# Patient Record
Sex: FEMALE | Race: WHITE | NOT HISPANIC OR LATINO | Employment: UNEMPLOYED | URBAN - METROPOLITAN AREA
[De-identification: names, ages, dates, MRNs, and addresses within clinical notes are randomized per-mention and may not be internally consistent; named-entity substitution may affect disease eponyms.]

---

## 2023-03-01 LAB
ALANINE AMINOTRANSFERASE (SGPT) (U/L) IN SER/PLAS: 13 U/L (ref 7–45)
ALBUMIN (G/DL) IN SER/PLAS: 4.4 G/DL (ref 3.4–5)
ALBUMIN (MG/L) IN URINE: <7 MG/L
ALBUMIN/CREATININE (UG/MG) IN URINE: ABNORMAL UG/MG CRT (ref 0–30)
ALKALINE PHOSPHATASE (U/L) IN SER/PLAS: 66 U/L (ref 33–110)
ANION GAP IN SER/PLAS: 12 MMOL/L (ref 10–20)
ASPARTATE AMINOTRANSFERASE (SGOT) (U/L) IN SER/PLAS: 16 U/L (ref 9–39)
BASOPHILS (10*3/UL) IN BLOOD BY AUTOMATED COUNT: 0.08 X10E9/L (ref 0–0.1)
BASOPHILS/100 LEUKOCYTES IN BLOOD BY AUTOMATED COUNT: 1.2 % (ref 0–2)
BILIRUBIN TOTAL (MG/DL) IN SER/PLAS: 0.4 MG/DL (ref 0–1.2)
CALCIUM (MG/DL) IN SER/PLAS: 9.5 MG/DL (ref 8.6–10.3)
CARBON DIOXIDE, TOTAL (MMOL/L) IN SER/PLAS: 29 MMOL/L (ref 21–32)
CHLORIDE (MMOL/L) IN SER/PLAS: 104 MMOL/L (ref 98–107)
CHOLESTEROL (MG/DL) IN SER/PLAS: 138 MG/DL (ref 0–199)
CHOLESTEROL IN HDL (MG/DL) IN SER/PLAS: 58.7 MG/DL
CHOLESTEROL/HDL RATIO: 2.4
CREATININE (MG/DL) IN SER/PLAS: 0.65 MG/DL (ref 0.5–1.05)
CREATININE (MG/DL) IN URINE: 11.4 MG/DL (ref 20–320)
EOSINOPHILS (10*3/UL) IN BLOOD BY AUTOMATED COUNT: 0.59 X10E9/L (ref 0–0.7)
EOSINOPHILS/100 LEUKOCYTES IN BLOOD BY AUTOMATED COUNT: 8.5 % (ref 0–6)
ERYTHROCYTE DISTRIBUTION WIDTH (RATIO) BY AUTOMATED COUNT: 11.9 % (ref 11.5–14.5)
ERYTHROCYTE MEAN CORPUSCULAR HEMOGLOBIN CONCENTRATION (G/DL) BY AUTOMATED: 33.7 G/DL (ref 32–36)
ERYTHROCYTE MEAN CORPUSCULAR VOLUME (FL) BY AUTOMATED COUNT: 95 FL (ref 80–100)
ERYTHROCYTES (10*6/UL) IN BLOOD BY AUTOMATED COUNT: 4.39 X10E12/L (ref 4–5.2)
GFR FEMALE: >90 ML/MIN/1.73M2
GLUCOSE (MG/DL) IN SER/PLAS: 142 MG/DL (ref 74–99)
HEMATOCRIT (%) IN BLOOD BY AUTOMATED COUNT: 41.6 % (ref 36–46)
HEMOGLOBIN (G/DL) IN BLOOD: 14 G/DL (ref 12–16)
IMMATURE GRANULOCYTES (10*3/UL) ABSOLUTE: 0.01 X10E9/L (ref 0–0.7)
IMMATURE GRANULOCYTES/100 LEUKOCYTES IN BLOOD BY AUTOMATED COUNT: 0.1 % (ref 0–0.9)
LDL: 62 MG/DL (ref 0–99)
LEUKOCYTES (10*3/UL) IN BLOOD BY AUTOMATED COUNT: 6.9 X10E9/L (ref 4.4–11.3)
LYMPHOCYTES (10*3/UL) IN BLOOD BY AUTOMATED COUNT: 2.99 X10E9/L (ref 1.2–4.8)
LYMPHOCYTES/100 LEUKOCYTES IN BLOOD BY AUTOMATED COUNT: 41.9 % (ref 13–44)
MONOCYTES (10*3/UL) IN BLOOD BY AUTOMATED COUNT: 0.46 X10E9/L (ref 0.1–1)
MONOCYTES/100 LEUKOCYTES IN BLOOD BY AUTOMATED COUNT: 6.6 % (ref 2–10)
NEUTROPHILS (10*3/UL) IN BLOOD BY AUTOMATED COUNT: 2.88 X10E9/L (ref 1.2–7.7)
NEUTROPHILS/100 LEUKOCYTES IN BLOOD BY AUTOMATED COUNT: 41.7 % (ref 40–80)
NRBC (PER 100 WBCS) BY AUTOMATED COUNT: 0 /100 WBC (ref 0–0)
PHOSPHATE (MG/DL) IN SER/PLAS: 4.7 MG/DL (ref 2.5–4.9)
PLATELETS (10*3/UL) IN BLOOD AUTOMATED COUNT: 320 X10E9/L (ref 150–450)
POTASSIUM (MMOL/L) IN SER/PLAS: 4.2 MMOL/L (ref 3.5–5.3)
PROTEIN TOTAL: 6.6 G/DL (ref 6.4–8.2)
SODIUM (MMOL/L) IN SER/PLAS: 141 MMOL/L (ref 136–145)
THYROTROPIN (MIU/L) IN SER/PLAS BY DETECTION LIMIT <= 0.05 MIU/L: 4.5 MIU/L (ref 0.44–3.98)
THYROXINE (T4) FREE (NG/DL) IN SER/PLAS: 1.17 NG/DL (ref 0.61–1.12)
TRIGLYCERIDE (MG/DL) IN SER/PLAS: 86 MG/DL (ref 0–149)
UREA NITROGEN (MG/DL) IN SER/PLAS: 15 MG/DL (ref 6–23)
VLDL: 17 MG/DL (ref 0–40)

## 2023-03-02 LAB
CALCIDIOL (25 OH VITAMIN D3) (NG/ML) IN SER/PLAS: 78 NG/ML
COBALAMIN (VITAMIN B12) (PG/ML) IN SER/PLAS: >2000 PG/ML (ref 211–911)

## 2023-05-17 LAB — COBALAMIN (VITAMIN B12) (PG/ML) IN SER/PLAS: 366 PG/ML (ref 211–911)

## 2023-08-24 PROBLEM — Z22.322 MRSA (METHICILLIN RESISTANT STAPH AUREUS) CULTURE POSITIVE: Status: ACTIVE | Noted: 2023-08-24

## 2023-08-24 PROBLEM — E78.5 HYPERLIPIDEMIA: Status: ACTIVE | Noted: 2023-08-24

## 2023-08-24 PROBLEM — E03.9 HYPOTHYROIDISM: Status: ACTIVE | Noted: 2023-08-24

## 2023-08-24 PROBLEM — N91.2 AMENORRHEA: Status: ACTIVE | Noted: 2023-08-24

## 2023-08-24 PROBLEM — F89 DEVELOPMENTAL DISABILITY: Status: ACTIVE | Noted: 2023-08-24

## 2023-08-24 PROBLEM — E10.65 TYPE 1 DIABETES MELLITUS WITH HYPERGLYCEMIA (MULTI): Status: ACTIVE | Noted: 2023-08-24

## 2023-08-24 PROBLEM — L08.9 LOCAL INFECTION OF SKIN AND SUBCUTANEOUS TISSUE: Status: ACTIVE | Noted: 2023-08-24

## 2023-08-24 PROBLEM — L03.90 CELLULITIS: Status: ACTIVE | Noted: 2023-08-24

## 2023-08-24 PROBLEM — E10.9 TYPE 1 DIABETES MELLITUS WITHOUT COMPLICATION (MULTI): Status: ACTIVE | Noted: 2023-08-24

## 2023-08-24 PROBLEM — N92.6 IRREGULAR MENSES: Status: ACTIVE | Noted: 2023-08-24

## 2023-08-24 RX ORDER — PEN NEEDLE, DIABETIC 32GX 5/32"
NEEDLE, DISPOSABLE MISCELLANEOUS
COMMUNITY
Start: 2023-01-08

## 2023-08-24 RX ORDER — INSULIN LISPRO 100 [IU]/ML
50 INJECTION, SOLUTION INTRAVENOUS; SUBCUTANEOUS
COMMUNITY
End: 2024-01-19 | Stop reason: WASHOUT

## 2023-08-24 RX ORDER — CYANOCOBALAMIN (VITAMIN B-12) 500 MCG
1 TABLET ORAL NIGHTLY
COMMUNITY
End: 2024-04-16 | Stop reason: WASHOUT

## 2023-08-24 RX ORDER — LEVOTHYROXINE SODIUM 125 UG/1
125 TABLET ORAL
COMMUNITY
End: 2023-11-15 | Stop reason: WASHOUT

## 2023-08-24 RX ORDER — GUANFACINE 2 MG/1
2 TABLET, EXTENDED RELEASE ORAL DAILY
COMMUNITY

## 2023-08-24 RX ORDER — ERGOCALCIFEROL 1.25 MG/1
50000 CAPSULE ORAL
COMMUNITY
Start: 2023-02-24

## 2023-08-24 RX ORDER — INSULIN GLARGINE 100 [IU]/ML
12 INJECTION, SOLUTION SUBCUTANEOUS NIGHTLY
COMMUNITY
End: 2024-01-19 | Stop reason: WASHOUT

## 2023-10-19 NOTE — PROGRESS NOTES
"HPI:  43 yo with Diabetes 1, hashimoto's dz, dyslipidemia, developmental delay presents for followup. A1c-8.5% in the past month. Pt is testing sugars 4 times per day, using dexcom. Pt is having low sugars 1-2 times/week, recently after dinner. Sugars in upper 100-200's on waking, variable at lunch (at workshop), dinner 100-200's, bedtime variable.  Pt is following a carb controlled diet and knows reasonable carb allowances. The challenge is pt may be getting extra food/snack at workshop and not receiving insulin. This has made a challenging situation more challenging. Pt is able to afford their medications. Pt is walking as exercise.           Using 8 units lantus qhs, Humalog 7-7-6-0, isf-20 at 140. Pt wouldn't be able to control pump on her own/give prandial dose at workshop after taking with her caregiver.    Discussed having pump locked with a tslim and she already has dexcom g6.  Could get a straight partial prandial dose at workshop for her lunches.                  Pt taking estradiol through her ob for premature menopause.           Taking 100mg syntroid, no obstructive sx, euthyroid by hx.           Taking atorvastatin 20mg for lipids and tolerating.           Full labs reviewed from march 2023 in ecw.    History is obtained from Ani valle's caregiver today.     Current Outpatient Medications:     BD Claudia 2nd Gen Pen Needle 32 gauge x 5/32\" needle, , Disp: , Rfl:     blood sugar diagnostic strip, as directed sq three times per day, e10.65 for 90 days, Disp: , Rfl:     ergocalciferol (Vitamin D-2) 1.25 MG (56922 UT) capsule, Take 1 capsule (50,000 Units) by mouth 1 (one) time per week., Disp: , Rfl:     guanFACINE (Intuniv) 2 mg 24 hr tablet, Take 1 tablet (2 mg) by mouth once daily., Disp: , Rfl:     insulin lispro (HumaLOG KwikPen Insulin) 100 unit/mL injection, Inject 50 Units under the skin. Daily with meals, Disp: , Rfl:     Lantus Solostar U-100 Insulin 100 unit/mL (3 mL) pen, Inject 12 Units under " the skin once daily at bedtime., Disp: , Rfl:     levothyroxine (Synthroid, Levoxyl) 125 mcg tablet, Take 1 tablet (125 mcg) by mouth once daily in the morning. Take before meals., Disp: , Rfl:     melatonin tablet, Take 1 tablet (1 mg) by mouth once daily at bedtime., Disp: , Rfl:     NON FORMULARY, Estradiol, Disp: , Rfl:    Review of Systems:  Cardiology: Lightheadedness-denies.  Chest pain-denies.  Leg edema-denies.  Palpitations-denies.  Respiratory: Cough-denies. Shortness of breath-denies.  Wheezing-denies.  Gastroenterology: Constipation-denies.  Diarrhea-denies.  Heartburn-denies.  Endocrinology: Cold intolerance-denies.  Heat intolerance-denies.  Sweats-denies.  Neurology: Headache-denies.  Tremor-denies.  Neuropathy in extremities-denies.  Psychology: Low energy-denies.  Irritability-denies.  Sleep disturbances-denies.        Labs: see hpi    /76   Pulse 58   Wt 72.1 kg (159 lb)   BMI 32.11 kg/m²     Physical Exam:  General Appearance: pleasant, cooperative, no acute distress  HEENT: no chemosis, no proptosis, no lid lag, no lid retraction  Neck: no lymphadenopathy, no thyromegaly, no dominant thyroid nodules  Heart: no murmurs, regular rate and rhythm, S1 and S2  Lungs: no wheezes, no rhonci, no rales  Extremities: no lower extremity swelling    Assessment and Plan:  1. Type 1 diabetes mellitus without complication (CMS/HCC)  -very challenging case, issue is what pt eats at lunch at workshop is not controlled  -look into [facility] where the pump would be locked and her caregiver could bolus for her, at workshop pt could get a partial injected prandial dose  -despite hours/years of visits and teaching we are still not close to glycemic targets    2. Mixed hyperlipidemia  -on statin and tolerating    3. Hypothyroidism, unspecified type  -euthyroid    4. Developmental disability           Follow Up:  3 months Trey Garcia    -labs/tests/notes reviewed  -reviewed and counseled patient on medication  monitoring and side effects

## 2023-10-20 ENCOUNTER — OFFICE VISIT (OUTPATIENT)
Dept: ENDOCRINOLOGY | Facility: CLINIC | Age: 43
End: 2023-10-20
Payer: MEDICARE

## 2023-10-20 VITALS
HEART RATE: 58 BPM | WEIGHT: 159 LBS | DIASTOLIC BLOOD PRESSURE: 76 MMHG | SYSTOLIC BLOOD PRESSURE: 128 MMHG | BODY MASS INDEX: 32.11 KG/M2

## 2023-10-20 DIAGNOSIS — E03.9 HYPOTHYROIDISM, UNSPECIFIED TYPE: ICD-10-CM

## 2023-10-20 DIAGNOSIS — E10.9 TYPE 1 DIABETES MELLITUS WITHOUT COMPLICATION (MULTI): Primary | ICD-10-CM

## 2023-10-20 DIAGNOSIS — F89 DEVELOPMENTAL DISABILITY: ICD-10-CM

## 2023-10-20 DIAGNOSIS — E10.65 TYPE 1 DIABETES MELLITUS WITH HYPERGLYCEMIA (MULTI): ICD-10-CM

## 2023-10-20 DIAGNOSIS — E78.2 MIXED HYPERLIPIDEMIA: ICD-10-CM

## 2023-10-20 LAB — POC HEMOGLOBIN A1C: 10.1 % (ref 4.2–6.5)

## 2023-10-20 PROCEDURE — 3078F DIAST BP <80 MM HG: CPT | Performed by: INTERNAL MEDICINE

## 2023-10-20 PROCEDURE — 99214 OFFICE O/P EST MOD 30 MIN: CPT | Performed by: INTERNAL MEDICINE

## 2023-10-20 PROCEDURE — 3074F SYST BP LT 130 MM HG: CPT | Performed by: INTERNAL MEDICINE

## 2023-10-20 PROCEDURE — 83036 HEMOGLOBIN GLYCOSYLATED A1C: CPT | Performed by: INTERNAL MEDICINE

## 2023-10-20 PROCEDURE — 1036F TOBACCO NON-USER: CPT | Performed by: INTERNAL MEDICINE

## 2023-10-20 ASSESSMENT — ENCOUNTER SYMPTOMS: DEPRESSION: 0

## 2023-10-20 ASSESSMENT — PAIN SCALES - GENERAL: PAINLEVEL: 0-NO PAIN

## 2023-10-30 ENCOUNTER — TELEPHONE (OUTPATIENT)
Dept: ENDOCRINOLOGY | Facility: CLINIC | Age: 43
End: 2023-10-30
Payer: MEDICAID

## 2023-10-30 NOTE — TELEPHONE ENCOUNTER
patient needs to get fasting blood work done but her sugar was 318 this morning she wants to know what to do

## 2023-11-06 ENCOUNTER — LAB (OUTPATIENT)
Dept: LAB | Facility: LAB | Age: 43
End: 2023-11-06
Payer: MEDICARE

## 2023-11-06 DIAGNOSIS — E03.9 HYPOTHYROIDISM, UNSPECIFIED TYPE: ICD-10-CM

## 2023-11-06 DIAGNOSIS — E10.9 TYPE 1 DIABETES MELLITUS WITHOUT COMPLICATION (MULTI): ICD-10-CM

## 2023-11-06 DIAGNOSIS — E78.2 MIXED HYPERLIPIDEMIA: ICD-10-CM

## 2023-11-06 DIAGNOSIS — E03.9 HYPOTHYROIDISM, UNSPECIFIED TYPE: Primary | ICD-10-CM

## 2023-11-06 LAB
ALBUMIN SERPL BCP-MCNC: 3.7 G/DL (ref 3.4–5)
ALP SERPL-CCNC: 53 U/L (ref 33–110)
ALT SERPL W P-5'-P-CCNC: 16 U/L (ref 7–45)
ANION GAP SERPL CALC-SCNC: 15 MMOL/L (ref 10–20)
AST SERPL W P-5'-P-CCNC: 22 U/L (ref 9–39)
BASOPHILS # BLD AUTO: 0.09 X10*3/UL (ref 0–0.1)
BASOPHILS NFR BLD AUTO: 1.2 %
BILIRUB SERPL-MCNC: 0.6 MG/DL (ref 0–1.2)
BUN SERPL-MCNC: 13 MG/DL (ref 6–23)
C PEPTIDE SERPL-MCNC: <0.1 NG/ML (ref 0.7–3.9)
CALCIUM SERPL-MCNC: 8.6 MG/DL (ref 8.6–10.3)
CHLORIDE SERPL-SCNC: 101 MMOL/L (ref 98–107)
CHOLEST SERPL-MCNC: 125 MG/DL (ref 0–199)
CHOLESTEROL/HDL RATIO: 2.2
CO2 SERPL-SCNC: 24 MMOL/L (ref 21–32)
CREAT SERPL-MCNC: 0.66 MG/DL (ref 0.5–1.05)
CREAT UR-MCNC: 140 MG/DL (ref 20–320)
EOSINOPHIL # BLD AUTO: 0.81 X10*3/UL (ref 0–0.7)
EOSINOPHIL NFR BLD AUTO: 10.7 %
ERYTHROCYTE [DISTWIDTH] IN BLOOD BY AUTOMATED COUNT: 12.4 % (ref 11.5–14.5)
GFR SERPL CREATININE-BSD FRML MDRD: >90 ML/MIN/1.73M*2
GLUCOSE SERPL-MCNC: 268 MG/DL (ref 74–99)
HCT VFR BLD AUTO: 44.4 % (ref 36–46)
HDLC SERPL-MCNC: 55.9 MG/DL
HGB BLD-MCNC: 14.3 G/DL (ref 12–16)
IMM GRANULOCYTES # BLD AUTO: 0.02 X10*3/UL (ref 0–0.7)
IMM GRANULOCYTES NFR BLD AUTO: 0.3 % (ref 0–0.9)
LYMPHOCYTES # BLD AUTO: 2.57 X10*3/UL (ref 1.2–4.8)
LYMPHOCYTES NFR BLD AUTO: 34 %
MCH RBC QN AUTO: 32 PG (ref 26–34)
MCHC RBC AUTO-ENTMCNC: 32.2 G/DL (ref 32–36)
MCV RBC AUTO: 99 FL (ref 80–100)
MICROALBUMIN UR-MCNC: <7 MG/L
MICROALBUMIN/CREAT UR: NORMAL MG/G{CREAT}
MONOCYTES # BLD AUTO: 0.53 X10*3/UL (ref 0.1–1)
MONOCYTES NFR BLD AUTO: 7 %
NEUTROPHILS # BLD AUTO: 3.54 X10*3/UL (ref 1.2–7.7)
NEUTROPHILS NFR BLD AUTO: 46.8 %
NON-HDL CHOLESTEROL: 69 MG/DL (ref 0–149)
NRBC BLD-RTO: 0 /100 WBCS (ref 0–0)
PLATELET # BLD AUTO: 331 X10*3/UL (ref 150–450)
POTASSIUM SERPL-SCNC: 4 MMOL/L (ref 3.5–5.3)
PROT SERPL-MCNC: 6.4 G/DL (ref 6.4–8.2)
RBC # BLD AUTO: 4.47 X10*6/UL (ref 4–5.2)
SODIUM SERPL-SCNC: 136 MMOL/L (ref 136–145)
T4 FREE SERPL-MCNC: 0.83 NG/DL (ref 0.61–1.12)
TSH SERPL-ACNC: 11.03 MIU/L (ref 0.44–3.98)
WBC # BLD AUTO: 7.6 X10*3/UL (ref 4.4–11.3)

## 2023-11-06 PROCEDURE — 83718 ASSAY OF LIPOPROTEIN: CPT

## 2023-11-06 PROCEDURE — 80053 COMPREHEN METABOLIC PANEL: CPT

## 2023-11-06 PROCEDURE — 84681 ASSAY OF C-PEPTIDE: CPT

## 2023-11-06 PROCEDURE — 82570 ASSAY OF URINE CREATININE: CPT

## 2023-11-06 PROCEDURE — 84439 ASSAY OF FREE THYROXINE: CPT

## 2023-11-06 PROCEDURE — 82043 UR ALBUMIN QUANTITATIVE: CPT

## 2023-11-06 PROCEDURE — 36415 COLL VENOUS BLD VENIPUNCTURE: CPT

## 2023-11-06 PROCEDURE — 85025 COMPLETE CBC W/AUTO DIFF WBC: CPT

## 2023-11-06 PROCEDURE — 82465 ASSAY BLD/SERUM CHOLESTEROL: CPT

## 2023-11-06 PROCEDURE — 84443 ASSAY THYROID STIM HORMONE: CPT

## 2023-11-06 RX ORDER — LEVOTHYROXINE SODIUM 125 UG/1
125 TABLET ORAL
Qty: 90 TABLET | Refills: 3 | Status: SHIPPED | OUTPATIENT
Start: 2023-11-06 | End: 2023-11-15 | Stop reason: WASHOUT

## 2023-11-09 DIAGNOSIS — E10.65 TYPE 1 DIABETES MELLITUS WITH HYPERGLYCEMIA (MULTI): Primary | ICD-10-CM

## 2023-11-13 ENCOUNTER — LAB (OUTPATIENT)
Dept: LAB | Facility: LAB | Age: 43
End: 2023-11-13
Payer: MEDICARE

## 2023-11-13 DIAGNOSIS — E10.65 TYPE 1 DIABETES MELLITUS WITH HYPERGLYCEMIA (MULTI): ICD-10-CM

## 2023-11-13 LAB
ANION GAP SERPL CALC-SCNC: 11 MMOL/L (ref 10–20)
BUN SERPL-MCNC: 14 MG/DL (ref 6–23)
CALCIUM SERPL-MCNC: 9.1 MG/DL (ref 8.6–10.3)
CHLORIDE SERPL-SCNC: 100 MMOL/L (ref 98–107)
CO2 SERPL-SCNC: 29 MMOL/L (ref 21–32)
CREAT SERPL-MCNC: 0.73 MG/DL (ref 0.5–1.05)
GFR SERPL CREATININE-BSD FRML MDRD: >90 ML/MIN/1.73M*2
GLUCOSE SERPL-MCNC: 211 MG/DL (ref 74–99)
POTASSIUM SERPL-SCNC: 3.7 MMOL/L (ref 3.5–5.3)
SODIUM SERPL-SCNC: 136 MMOL/L (ref 136–145)

## 2023-11-13 PROCEDURE — 36415 COLL VENOUS BLD VENIPUNCTURE: CPT

## 2023-11-13 PROCEDURE — 80048 BASIC METABOLIC PNL TOTAL CA: CPT

## 2023-11-13 PROCEDURE — 84681 ASSAY OF C-PEPTIDE: CPT

## 2023-11-14 LAB — C PEPTIDE SERPL-MCNC: 0.1 NG/ML (ref 0.7–3.9)

## 2023-11-28 DIAGNOSIS — E10.65 TYPE 1 DIABETES MELLITUS WITH HYPERGLYCEMIA (MULTI): Primary | ICD-10-CM

## 2023-11-28 RX ORDER — BLOOD-GLUCOSE,RECEIVER,CONT
EACH MISCELLANEOUS
Qty: 1 EACH | Refills: 1 | Status: SHIPPED | OUTPATIENT
Start: 2023-11-28 | End: 2024-01-19 | Stop reason: WASHOUT

## 2023-11-28 RX ORDER — BLOOD-GLUCOSE TRANSMITTER
EACH MISCELLANEOUS
Qty: 1 EACH | Refills: 3 | Status: SHIPPED | OUTPATIENT
Start: 2023-11-28

## 2023-12-12 DIAGNOSIS — E10.65 TYPE 1 DIABETES MELLITUS WITH HYPERGLYCEMIA (MULTI): Primary | ICD-10-CM

## 2023-12-12 RX ORDER — INSULIN LISPRO 100 [IU]/ML
INJECTION, SOLUTION INTRAVENOUS; SUBCUTANEOUS
Qty: 90 ML | Refills: 3 | Status: SHIPPED | OUTPATIENT
Start: 2023-12-12

## 2023-12-22 ENCOUNTER — TELEPHONE (OUTPATIENT)
Dept: ENDOCRINOLOGY | Facility: CLINIC | Age: 43
End: 2023-12-22
Payer: MEDICAID

## 2023-12-22 NOTE — TELEPHONE ENCOUNTER
nurse calling because staff is concerned about the beeping from the pump she said it beeps quite oftenalso ther is a big difference from a fingerstick today 260 and pump today 333 a few days ago her fingerstick was 34 and the pump was 60

## 2024-01-19 ENCOUNTER — CLINICAL SUPPORT (OUTPATIENT)
Dept: ENDOCRINOLOGY | Facility: CLINIC | Age: 44
End: 2024-01-19
Payer: MEDICARE

## 2024-01-19 VITALS — SYSTOLIC BLOOD PRESSURE: 128 MMHG | WEIGHT: 157 LBS | BODY MASS INDEX: 31.71 KG/M2 | DIASTOLIC BLOOD PRESSURE: 76 MMHG

## 2024-01-19 DIAGNOSIS — E78.2 MIXED HYPERLIPIDEMIA: ICD-10-CM

## 2024-01-19 DIAGNOSIS — E03.9 HYPOTHYROIDISM, UNSPECIFIED TYPE: ICD-10-CM

## 2024-01-19 DIAGNOSIS — E10.65 TYPE 1 DIABETES MELLITUS WITH HYPERGLYCEMIA (MULTI): Primary | ICD-10-CM

## 2024-01-19 LAB — POC HEMOGLOBIN A1C: 8.4 % (ref 4.2–6.5)

## 2024-01-19 PROCEDURE — 95251 CONT GLUC MNTR ANALYSIS I&R: CPT | Performed by: INTERNAL MEDICINE

## 2024-01-19 PROCEDURE — 99214 OFFICE O/P EST MOD 30 MIN: CPT | Performed by: INTERNAL MEDICINE

## 2024-01-19 PROCEDURE — 83036 HEMOGLOBIN GLYCOSYLATED A1C: CPT | Performed by: INTERNAL MEDICINE

## 2024-01-19 RX ORDER — LEVOTHYROXINE SODIUM 125 UG/1
125 TABLET ORAL
Qty: 30 TABLET | Refills: 11 | Status: SHIPPED | OUTPATIENT
Start: 2024-01-19 | End: 2025-01-18

## 2024-01-19 RX ORDER — BLOOD-GLUCOSE SENSOR
EACH MISCELLANEOUS
COMMUNITY
Start: 2023-11-28 | End: 2024-06-02

## 2024-01-19 RX ORDER — ESTRADIOL AND NORETHINDRONE ACETATE .5; .1 MG/1; MG/1
TABLET ORAL
COMMUNITY
End: 2024-04-16 | Stop reason: WASHOUT

## 2024-01-19 RX ORDER — ATORVASTATIN CALCIUM 20 MG/1
20 TABLET, FILM COATED ORAL DAILY
COMMUNITY
End: 2024-03-21

## 2024-01-19 NOTE — PATIENT INSTRUCTIONS
INCREASE Levothyroxine from 100mcg to now 125mcg daily  - repeat blood work before next visit     Changes made to pump settings:  - new time segment 10a-3p correction factor from 50 to now 40    Lab Results   Component Value Date    HGBA1C 8.4 (A) 01/19/2024     KEEP UP THE GREAT WORK!

## 2024-01-19 NOTE — PROGRESS NOTES
"HPI     44 yo with Diabetes 1, hashimoto's dz, dyslipidemia, developmental delay presents for followup. A1c-10.1% last visit,  8.4% today. Pt is testing sugars >4 times per day, using dexcom. Pt is having low sugars 0 times/week since starting pump.   Pt is following a carb controlled diet and knows reasonable carb allowances. The challenge is pt may be getting extra food/snack at workshop and not receiving insulin. Pt is able to afford their medications. Pt is walking as exercise.    PUMP:  tandem tslim with dexcom cgm using control IQ  - pump training with nativid[employer] Dec. 2023 / approx. 1mon ago  Basal:   12a 0.75  ICR:   12a 1:15  ISF:   12a 50  - pt locked out of pump, caregiver bolusing for pt.  Relies on control iq autobolusing while at workshop during the day for her lunches  - caregiver got new smartphone since training/starting on pump,  now compatible with [employer] shantell.    Assisted in downloading shantell to iPhone during visit today, unfortunately Ani unable to remember login info has it written down at home.  Given tandem handouts for pairing pump to phone / troubleshooting shantell issues etc.,  instructed to contact [employer] for assistance if runs into any issues trying to do this on her own.                   Pt taking estradiol through her ob for premature menopause.           Taking 100mg syntroid, no obstructive sx, euthyroid by hx.           Taking atorvastatin 20mg for lipids and tolerating.          Full labs reviewed from march 2023 in ecw.    History is obtained from Ani pt's caregiver today.    30 days tconnect data reviewed & attached: 63% in target (best yet by far!),  0% lows   - mostly smooth mid 100 range at all times EXCEPT midday when at workshop excursions into mid 200 range        Current Outpatient Medications:     BD Claudia 2nd Gen Pen Needle 32 gauge x 5/32\" needle, , Disp: , Rfl:     blood sugar diagnostic strip, as directed sq three times per day, e10.65 for 90 days, Disp: , Rfl:     Dexcom " G4 platinum transmitter (Dexcom G6 Transmitter) device, USE AS DIRECTED FOR 90 DAYS, Disp: 1 each, Rfl: 3    Dexcom G6 Sensor device, USE AS DIRECTED EVERY 10 DAYS FOR 30 DAYS, Disp: , Rfl:     ergocalciferol (Vitamin D-2) 1.25 MG (20639 UT) capsule, Take 1 capsule (50,000 Units) by mouth 1 (one) time per week., Disp: , Rfl:     guanFACINE (Intuniv) 2 mg 24 hr tablet, Take 1 tablet (2 mg) by mouth once daily., Disp: , Rfl:     insulin lispro (HumaLOG) 100 unit/mL injection, -up to 100 units daily as directed in insulin pump, Disp: 90 mL, Rfl: 3    levothyroxine (Synthroid, Levoxyl) 125 mcg tablet, Take 1 tablet (125 mcg) by mouth once daily in the morning. Take before meals., Disp: 30 tablet, Rfl: 11    melatonin tablet, Take 1 tablet (1 mg) by mouth once daily at bedtime., Disp: , Rfl:     atorvastatin (Lipitor) 20 mg tablet, Take 1 tablet (20 mg) by mouth once daily., Disp: , Rfl:     estradiol-norethindrone acet 0.5-0.1 mg tablet, TAKE 1 TABLET BY MOUTH EVERY DAY FOR 28 DAYS ORALLY ONCE A DAY 90 DAYS, Disp: , Rfl:     Allergies as of 01/19/2024    (No Known Allergies)       /76   Wt 71.2 kg (157 lb)   BMI 31.71 kg/m²     Labs:   Lab Results   Component Value Date    WBC 7.6 11/06/2023    NRBC 0.0 11/06/2023    RBC 4.47 11/06/2023    HGB 14.3 11/06/2023    HCT 44.4 11/06/2023     11/06/2023     Lab Results   Component Value Date    CALCIUM 9.1 11/13/2023    AST 22 11/06/2023    ALKPHOS 53 11/06/2023    BILITOT 0.6 11/06/2023    PROT 6.4 11/06/2023    ALBUMIN 3.7 11/06/2023    GLOB 2.9 12/21/2021    AGR 1.4 (L) 12/21/2021     11/13/2023    K 3.7 11/13/2023     11/13/2023    CO2 29 11/13/2023    ANIONGAP 11 11/13/2023    BUN 14 11/13/2023    CREATININE 0.73 11/13/2023    UREACREAUR 21.4 (H) 02/09/2023    GLUCOSE 211 (H) 11/13/2023    ALT 16 11/06/2023    EGFR >90 11/13/2023     Lab Results   Component Value Date    CHOL 125 11/06/2023    TRIG 86 03/01/2023    HDL 55.9 11/06/2023    LDLCALC  64 (L) 09/15/2020     Lab Results   Component Value Date    MICROALBCREA  11/06/2023      Comment:      One or more analytes used in this calculation is outside of the analytical measurement range. Calculation cannot be performed.     Lab Results   Component Value Date    TSH 11.03 (H) 11/06/2023     Lab Results   Component Value Date    [id number] 366 05/17/2023     Lab Results   Component Value Date    HGBA1C 8.4 (A) 01/19/2024         Assessment/Plan   1. Type 1 diabetes mellitus with hyperglycemia (CMS/[facility])  Outstanding improvement in glycemic control with only approx. 1mon on hybrid closed loop system   - intensify ISF 10a-3p while at workshop from 50 to now 40   - finish setting up [employer] shantell once locate login info at home,  contact [employer] to help facilitate this if needed     2. Mixed hyperlipidemia  On statin,  tolerating     3. Hypothyroidism, unspecified type  Reviewed labs - currently taking 100mcg daily   - suggest incr to 125mcg daily,  repeat labs prior to next visit       Follow up: 3 months Radha, 6 months 30min BB    -labs/tests/notes reviewed  -reviewed and counseled patient on medication monitoring and side effects    Treatment and plan discussed with Dr. Koch.  LIAM Piper, PharmD, BC-ADM, [facility].     Medical Decision Making  Complexity of problem: Chronic illness of diabetes mellitus uncontrolled, progressing  Data analyzed and reviewed: Reviewed prior notes, blood glucose data, labs including HgbA1c, lipids, serum chemistries.  Ordered tests.   Risk of complications and morbidities: Is definite because of use of insulin and risk of hypoglycemia.  Prescription medications reviewed and modifications made.  Compliance assessed.  Addressed social determinants of health including food insecurity.

## 2024-01-19 NOTE — PROGRESS NOTES
I, Dr Cristian Koch, have reviewed this progress note, medication list, vital signs, any pertinent lab values, and any CGM data if present with the [facility] and  face to face during this visit today. This note reflects the treatment plan that was made under my direction after reviewing the above mentioned elements while face to face with the patient and CDE.  I personally answered and addressed any questions and concerns the patient had during the visit today.  The CDE entered the data in this note under my direction and I personally reviewed it, signed any lab or medication orders that I instructed to be completed. I am the billing provider for this visit and the level of service was determined by my involvement in the Medical Decision Making Component of this visit while face to face with the patient.    Electronically signed by Cristian Koch MD on 1/19/24 at 3:38 PM.

## 2024-01-31 ENCOUNTER — OFFICE VISIT (OUTPATIENT)
Dept: PRIMARY CARE | Facility: CLINIC | Age: 44
End: 2024-01-31
Payer: MEDICARE

## 2024-01-31 VITALS
SYSTOLIC BLOOD PRESSURE: 104 MMHG | HEIGHT: 59 IN | HEART RATE: 66 BPM | BODY MASS INDEX: 31.45 KG/M2 | DIASTOLIC BLOOD PRESSURE: 71 MMHG | OXYGEN SATURATION: 95 % | WEIGHT: 156 LBS

## 2024-01-31 DIAGNOSIS — Z00.00 WELLNESS EXAMINATION: ICD-10-CM

## 2024-01-31 DIAGNOSIS — E10.65 TYPE 1 DIABETES MELLITUS WITH HYPERGLYCEMIA (MULTI): Primary | ICD-10-CM

## 2024-01-31 DIAGNOSIS — F89 DEVELOPMENTAL DISABILITY: ICD-10-CM

## 2024-01-31 DIAGNOSIS — E03.9 HYPOTHYROIDISM, UNSPECIFIED TYPE: ICD-10-CM

## 2024-01-31 DIAGNOSIS — Z12.31 BREAST CANCER SCREENING BY MAMMOGRAM: ICD-10-CM

## 2024-01-31 PROCEDURE — 3074F SYST BP LT 130 MM HG: CPT | Performed by: NURSE PRACTITIONER

## 2024-01-31 PROCEDURE — 3078F DIAST BP <80 MM HG: CPT | Performed by: NURSE PRACTITIONER

## 2024-01-31 PROCEDURE — 99204 OFFICE O/P NEW MOD 45 MIN: CPT | Performed by: NURSE PRACTITIONER

## 2024-01-31 PROCEDURE — 1036F TOBACCO NON-USER: CPT | Performed by: NURSE PRACTITIONER

## 2024-01-31 NOTE — PROGRESS NOTES
"Subjective   Patient ID: Seda Gutierrez is a 43 y.o. female who presents for New Patient Visit (Patient is here today to establish new pcp care today. ).    43-year-old female very pleasant , new pt here to establish care.   Accompanied by sister who is deaf.  She did well reading my lips and her sister/Seda/patient did sign language.  His 2 sisters live together  medical HX beside DM1- treated by endo  Hypothyroid- managed by endo  Mamm for breast ca screen 2/2023  GYN 2 yrs ago  Vision- yearly- wears glasses; and a specialist for the DM1  Dentist- Yearly  Podiatry- regularly for DM1  No nicotene  No ETOH  Dry skin.   No menstrual Cycle- GYN was following for hormone replacement. Unknown cause for no cycle. Hormones have not worked.          Review of Systems    Objective   /71   Pulse 66   Ht 1.499 m (4' 11\")   Wt 70.8 kg (156 lb)   SpO2 95%   BMI 31.51 kg/m²     Physical Exam  Vitals and nursing note reviewed.   Constitutional:       General: She is not in acute distress.     Appearance: Normal appearance. She is normal weight.   HENT:      Head: Normocephalic and atraumatic.      Nose: Nose normal.      Mouth/Throat:      Mouth: Mucous membranes are moist.      Pharynx: Oropharynx is clear.   Eyes:      Extraocular Movements: Extraocular movements intact.      Conjunctiva/sclera: Conjunctivae normal.      Pupils: Pupils are equal, round, and reactive to light.   Neck:      Vascular: No carotid bruit.   Cardiovascular:      Rate and Rhythm: Normal rate and regular rhythm.      Pulses: Normal pulses.      Heart sounds: Normal heart sounds.   Pulmonary:      Effort: Pulmonary effort is normal.      Breath sounds: Normal breath sounds.   Abdominal:      General: Bowel sounds are normal. There is no distension.      Palpations: Abdomen is soft.      Tenderness: There is no abdominal tenderness.   Musculoskeletal:         General: Normal range of motion.      Cervical back: Normal range of motion and " neck supple.      Right lower leg: No edema.      Left lower leg: No edema.   Lymphadenopathy:      Cervical: No cervical adenopathy.   Skin:     General: Skin is warm and dry.      Capillary Refill: Capillary refill takes less than 2 seconds.   Neurological:      General: No focal deficit present.      Mental Status: She is alert and oriented to person, place, and time.      Motor: No weakness.   Psychiatric:         Mood and Affect: Mood normal.         Behavior: Behavior normal.         Thought Content: Thought content normal.      Comments: Developmental disability.         Assessment/Plan   Diagnoses and all orders for this visit:  Type 1 diabetes mellitus with hyperglycemia (CMS/HCC)  Comments:  Managed by endocrinology with insulin pump  Breast cancer screening by mammogram  Comments:  Mammogram ordered  Orders:  -     BI mammo bilateral screening tomosynthesis; Future  Wellness examination  Comments:  Referral for gynecology mammogram ordered for breast cancer screening.  Vaccines up-to-date.  Recent blood work reviewed.  Orders:  -     Referral to Gynecology; Future  Developmental disability  Comments:  She does quite well.  Her sister seems to be very helpful  Hypothyroidism, unspecified type  Comments:  Managed by endocrinology on supplementation  Other orders  -     Follow Up In Primary Care - Health Maintenance; Future

## 2024-02-01 PROBLEM — Z22.322 MRSA (METHICILLIN RESISTANT STAPH AUREUS) CULTURE POSITIVE: Status: RESOLVED | Noted: 2023-08-24 | Resolved: 2024-02-01

## 2024-02-01 PROBLEM — L08.9 LOCAL INFECTION OF SKIN AND SUBCUTANEOUS TISSUE: Status: RESOLVED | Noted: 2023-08-24 | Resolved: 2024-02-01

## 2024-02-01 PROBLEM — L03.90 CELLULITIS: Status: RESOLVED | Noted: 2023-08-24 | Resolved: 2024-02-01

## 2024-02-26 ENCOUNTER — HOSPITAL ENCOUNTER (OUTPATIENT)
Dept: RADIOLOGY | Facility: HOSPITAL | Age: 44
Discharge: HOME | End: 2024-02-26
Payer: MEDICARE

## 2024-02-26 VITALS — HEIGHT: 63 IN | WEIGHT: 150 LBS | BODY MASS INDEX: 26.58 KG/M2

## 2024-02-26 DIAGNOSIS — Z12.31 BREAST CANCER SCREENING BY MAMMOGRAM: ICD-10-CM

## 2024-02-26 PROCEDURE — 77067 SCR MAMMO BI INCL CAD: CPT

## 2024-03-08 ENCOUNTER — APPOINTMENT (OUTPATIENT)
Dept: OBSTETRICS AND GYNECOLOGY | Facility: CLINIC | Age: 44
End: 2024-03-08
Payer: MEDICARE

## 2024-03-15 ENCOUNTER — APPOINTMENT (OUTPATIENT)
Dept: OBSTETRICS AND GYNECOLOGY | Facility: CLINIC | Age: 44
End: 2024-03-15
Payer: MEDICARE

## 2024-03-20 DIAGNOSIS — E78.5 HYPERLIPIDEMIA, UNSPECIFIED: ICD-10-CM

## 2024-03-21 RX ORDER — ATORVASTATIN CALCIUM 20 MG/1
20 TABLET, FILM COATED ORAL DAILY
Qty: 90 TABLET | Refills: 1 | Status: SHIPPED | OUTPATIENT
Start: 2024-03-21 | End: 2024-09-17

## 2024-04-03 PROCEDURE — 88305 TISSUE EXAM BY PATHOLOGIST: CPT

## 2024-04-03 PROCEDURE — 88305 TISSUE EXAM BY PATHOLOGIST: CPT | Performed by: PATHOLOGY

## 2024-04-04 ENCOUNTER — LAB REQUISITION (OUTPATIENT)
Dept: LAB | Facility: HOSPITAL | Age: 44
End: 2024-04-04
Payer: MEDICARE

## 2024-04-04 DIAGNOSIS — N95.0 POSTMENOPAUSAL BLEEDING: ICD-10-CM

## 2024-04-05 LAB
LABORATORY COMMENT REPORT: NORMAL
PATH REPORT.FINAL DX SPEC: NORMAL
PATH REPORT.GROSS SPEC: NORMAL
PATH REPORT.RELEVANT HX SPEC: NORMAL
PATH REPORT.TOTAL CANCER: NORMAL

## 2024-04-12 ENCOUNTER — TELEPHONE (OUTPATIENT)
Dept: ENDOCRINOLOGY | Facility: CLINIC | Age: 44
End: 2024-04-12
Payer: MEDICARE

## 2024-04-12 NOTE — TELEPHONE ENCOUNTER
Patient started having low blood sugar on April 8 and it seems to be getting worse wondering if there should be any adjustments please call [phone]

## 2024-04-16 ENCOUNTER — CLINICAL SUPPORT (OUTPATIENT)
Dept: ENDOCRINOLOGY | Facility: CLINIC | Age: 44
End: 2024-04-16
Payer: MEDICARE

## 2024-04-16 VITALS
WEIGHT: 150.2 LBS | BODY MASS INDEX: 26.61 KG/M2 | DIASTOLIC BLOOD PRESSURE: 71 MMHG | HEART RATE: 49 BPM | SYSTOLIC BLOOD PRESSURE: 101 MMHG

## 2024-04-16 DIAGNOSIS — E03.9 HYPOTHYROIDISM, UNSPECIFIED TYPE: ICD-10-CM

## 2024-04-16 DIAGNOSIS — E10.65 TYPE 1 DIABETES MELLITUS WITH HYPERGLYCEMIA (MULTI): Primary | ICD-10-CM

## 2024-04-16 DIAGNOSIS — E78.2 MIXED HYPERLIPIDEMIA: ICD-10-CM

## 2024-04-16 LAB — POC HEMOGLOBIN A1C: 7.3 % (ref 4.2–6.5)

## 2024-04-16 PROCEDURE — 83036 HEMOGLOBIN GLYCOSYLATED A1C: CPT | Performed by: INTERNAL MEDICINE

## 2024-04-16 PROCEDURE — 99214 OFFICE O/P EST MOD 30 MIN: CPT | Performed by: INTERNAL MEDICINE

## 2024-04-16 PROCEDURE — 95251 CONT GLUC MNTR ANALYSIS I&R: CPT | Performed by: INTERNAL MEDICINE

## 2024-04-16 NOTE — PROGRESS NOTES
I, Dr Critsian Koch, have reviewed this progress note, medication list, vital signs, any pertinent lab values, and any CGM data if present with the [facility] and  face to face during this visit today. This note reflects the treatment plan that was made under my direction after reviewing the above mentioned elements while face to face with the patient and CDE.  I personally answered and addressed any questions and concerns the patient had during the visit today.  The CDE entered the data in this note under my direction and I personally reviewed it, signed any lab or medication orders that I instructed to be completed. I am the billing provider for this visit and the level of service was determined by my involvement in the Medical Decision Making Component of this visit while face to face with the patient.    Electronically signed by Cristian Koch MD on 4/16/24 at 2:43 PM.

## 2024-04-16 NOTE — PROGRESS NOTES
"HPI     43 yo with Diabetes 1, hashimoto's dz, dyslipidemia, developmental delay presents for followup. A1c-8.4% last visit,  7.3% today. Pt is testing sugars >4 times per day, using dexcom g6. Pt is having low sugars 0-1 times/week.  Pt is following a carb controlled diet and knows reasonable carb allowances. The challenge is pt may be getting extra food/snack at workshop and not receiving insulin. Pt is able to afford their medications. Pt is walking as exercise.    PUMP:  tandem tslim with dexcom cgm using control IQ  - pump training with suzie Dec. 2023  Basal:   12a 0.75  ICR:   12a 1:15  ISF:   12a 50   10a 40  -changed from 50 last visit    3p 50    - pt locked out of pump, caregiver bolusing for pt.  Relies on control iq autobolusing while at workshop during the day for her lunches    - able to set up [employer] shantell,  successfully paired to pump today during visit.  Reinforced importance accessing / refreshing often       - 30 days tconnect data reviewed & attached:  65% in target,  2% lows,  patterns: mostly smooth mid to high 100 range while fasting,  occ postprandial excursions into mid 200 range especially midday when at workshop   - notes having issues with signal loss recently causing signif more variability in sugars.  Reinforced wearing pump on same side of body as dexcom (was on opposite side today in office)                    Pt taking estradiol through her ob for premature menopause.           Taking 125mcg levothyroxine,  incr last visit from 100mcg.  No obstructive sx, euthyroid by hx.           Taking atorvastatin 20mg for lipids and tolerating.    History is obtained from Ani pt's caregiver today.          Current Outpatient Medications:     atorvastatin (Lipitor) 20 mg tablet, TAKE 1 TABLET BY MOUTH EVERY DAY FOR 90 DAYS, Disp: 90 tablet, Rfl: 1    BD Claudia 2nd Gen Pen Needle 32 gauge x 5/32\" needle, , Disp: , Rfl:     blood sugar diagnostic strip, as directed sq three times per " day, e10.65 for 90 days, Disp: , Rfl:     Dexcom G4 platinum transmitter (Dexcom G6 Transmitter) device, USE AS DIRECTED FOR 90 DAYS, Disp: 1 each, Rfl: 3    Dexcom G6 Sensor device, USE AS DIRECTED EVERY 10 DAYS FOR 30 DAYS, Disp: , Rfl:     ergocalciferol (Vitamin D-2) 1.25 MG (97886 UT) capsule, Take 1 capsule (50,000 Units) by mouth 1 (one) time per week., Disp: , Rfl:     guanFACINE (Intuniv) 2 mg 24 hr tablet, Take 1 tablet (2 mg) by mouth once daily., Disp: , Rfl:     insulin lispro (HumaLOG) 100 unit/mL injection, -up to 100 units daily as directed in insulin pump, Disp: 90 mL, Rfl: 3    levothyroxine (Synthroid, Levoxyl) 125 mcg tablet, Take 1 tablet (125 mcg) by mouth once daily in the morning. Take before meals., Disp: 30 tablet, Rfl: 11    Allergies as of 04/16/2024    (No Known Allergies)       /71   Pulse (!) 49   Wt 68.1 kg (150 lb 3.2 oz)   BMI 26.61 kg/m²     Labs:   Lab Results   Component Value Date    WBC 7.6 11/06/2023    NRBC 0.0 11/06/2023    RBC 4.47 11/06/2023    HGB 14.3 11/06/2023    HCT 44.4 11/06/2023     11/06/2023     Lab Results   Component Value Date    CALCIUM 9.1 11/13/2023    AST 22 11/06/2023    ALKPHOS 53 11/06/2023    BILITOT 0.6 11/06/2023    PROT 6.4 11/06/2023    ALBUMIN 3.7 11/06/2023    GLOB 2.9 12/21/2021    AGR 1.4 (L) 12/21/2021     11/13/2023    K 3.7 11/13/2023     11/13/2023    CO2 29 11/13/2023    ANIONGAP 11 11/13/2023    BUN 14 11/13/2023    CREATININE 0.73 11/13/2023    UREACREAUR 21.4 (H) 02/09/2023    GLUCOSE 211 (H) 11/13/2023    ALT 16 11/06/2023    EGFR >90 11/13/2023     Lab Results   Component Value Date    CHOL 125 11/06/2023    TRIG 86 03/01/2023    HDL 55.9 11/06/2023    LDLCALC 64 (L) 09/15/2020     Lab Results   Component Value Date    MICROALBCREA  11/06/2023      Comment:      One or more analytes used in this calculation is outside of the analytical measurement range. Calculation cannot be performed.     Lab Results    Component Value Date    TSH 11.03 (H) 11/06/2023     Lab Results   Component Value Date    [id number] 366 05/17/2023     Lab Results   Component Value Date    HGBA1C 7.3 (A) 04/16/2024         Assessment/Plan   1. Type 1 diabetes mellitus with hyperglycemia (Multi)    -A1c ordered and reviewed  -30 days tslim/dexcom data reviewed  -labs reviewed    Best control have seen for pt yet!      Recent issues with signal loss interfering with control iq leading to more variability / low readings.  Stressed importance wearing pump on same side of the body as dexcom to help lessen this moving forward    Suggested no changes to pump settings for now given above       2. Mixed hyperlipidemia  On statin,  tolerating     3. Hypothyroidism, unspecified type  Incr dose from 100mcg to 125mcg daily last visit  - did not go for repeat labs,  ordered again today instructed to go      Follow up: July as previously scheduled BB    -labs/tests/notes reviewed  -reviewed and counseled patient on medication monitoring and side effects    Treatment and plan discussed with Dr. Koch.  LIAM Piper, PharmD, BC-ADM, [facility].     Medical Decision Making  Complexity of problem: Chronic illness of diabetes mellitus uncontrolled, progressing  Data analyzed and reviewed: Reviewed prior notes, blood glucose data, labs including HgbA1c, lipids, serum chemistries.  Ordered tests.   Risk of complications and morbidities: Is definite because of use of insulin and risk of hypoglycemia.  Prescription medications reviewed and modifications made.  Compliance assessed.  Addressed social determinants of health including food insecurity.

## 2024-04-16 NOTE — PATIENT INSTRUCTIONS
"No changes to pump settings     Continue exercise mode 24/7    Be sure to keep pump on same side of body as dexcom to help avoid signal loss     \"Refresh\" tconnect shantell often to allow most up to date data be available to us here in office when needed     Lab Results   Component Value Date    HGBA1C 7.3 (A) 04/16/2024     KEEP UP THE GREAT WORK!  "

## 2024-04-19 ENCOUNTER — APPOINTMENT (OUTPATIENT)
Dept: ENDOCRINOLOGY | Facility: CLINIC | Age: 44
End: 2024-04-19
Payer: MEDICARE

## 2024-04-20 ENCOUNTER — LAB (OUTPATIENT)
Dept: LAB | Facility: LAB | Age: 44
End: 2024-04-20
Payer: MEDICARE

## 2024-04-20 DIAGNOSIS — E03.9 HYPOTHYROIDISM, UNSPECIFIED TYPE: ICD-10-CM

## 2024-04-20 LAB
T4 FREE SERPL-MCNC: 1.1 NG/DL (ref 0.61–1.12)
TSH SERPL-ACNC: 0.36 MIU/L (ref 0.44–3.98)

## 2024-04-20 PROCEDURE — 36415 COLL VENOUS BLD VENIPUNCTURE: CPT

## 2024-04-20 PROCEDURE — 84443 ASSAY THYROID STIM HORMONE: CPT

## 2024-04-20 PROCEDURE — 84439 ASSAY OF FREE THYROXINE: CPT

## 2024-04-22 ENCOUNTER — PATIENT MESSAGE (OUTPATIENT)
Dept: ENDOCRINOLOGY | Facility: CLINIC | Age: 44
End: 2024-04-22

## 2024-05-31 DIAGNOSIS — E10.65 TYPE 1 DIABETES MELLITUS WITH HYPERGLYCEMIA (MULTI): Primary | ICD-10-CM

## 2024-06-02 RX ORDER — BLOOD-GLUCOSE SENSOR
EACH MISCELLANEOUS
Qty: 3 EACH | Refills: 3 | Status: SHIPPED | OUTPATIENT
Start: 2024-06-02

## 2024-06-17 DIAGNOSIS — E10.65 TYPE 1 DIABETES MELLITUS WITH HYPERGLYCEMIA (MULTI): ICD-10-CM

## 2024-06-17 RX ORDER — BLOOD-GLUCOSE TRANSMITTER
EACH MISCELLANEOUS
Qty: 1 EACH | Refills: 3 | Status: SHIPPED | OUTPATIENT
Start: 2024-06-17

## 2024-06-17 NOTE — TELEPHONE ENCOUNTER
Ani states that Seda has been experiencing Hypoglycemia for the past 2 days. Her BS has been between 35, 50, 77, 81 in between the hours of 10AM-5PM in exercise mode. Her WBS was better this morning at 140 in exercise mode. She would like to know if anything need to change?

## 2024-06-20 ENCOUNTER — OFFICE VISIT (OUTPATIENT)
Dept: PRIMARY CARE | Facility: CLINIC | Age: 44
End: 2024-06-20
Payer: MEDICARE

## 2024-06-20 VITALS
DIASTOLIC BLOOD PRESSURE: 63 MMHG | WEIGHT: 146 LBS | BODY MASS INDEX: 25.87 KG/M2 | HEART RATE: 50 BPM | HEIGHT: 63 IN | SYSTOLIC BLOOD PRESSURE: 94 MMHG | OXYGEN SATURATION: 97 %

## 2024-06-20 DIAGNOSIS — R62.50 DEVELOPMENTAL DELAY: ICD-10-CM

## 2024-06-20 DIAGNOSIS — Z13.220 LIPID SCREENING: ICD-10-CM

## 2024-06-20 DIAGNOSIS — R41.3 MEMORY IMPAIRMENT: ICD-10-CM

## 2024-06-20 DIAGNOSIS — Z00.00 ROUTINE GENERAL MEDICAL EXAMINATION AT HEALTH CARE FACILITY: ICD-10-CM

## 2024-06-20 DIAGNOSIS — E55.9 VITAMIN D DEFICIENCY: ICD-10-CM

## 2024-06-20 DIAGNOSIS — R53.82 CHRONIC FATIGUE: ICD-10-CM

## 2024-06-20 DIAGNOSIS — Z00.00 MEDICARE ANNUAL WELLNESS VISIT, SUBSEQUENT: Primary | ICD-10-CM

## 2024-06-20 PROCEDURE — 3078F DIAST BP <80 MM HG: CPT | Performed by: NURSE PRACTITIONER

## 2024-06-20 PROCEDURE — 3074F SYST BP LT 130 MM HG: CPT | Performed by: NURSE PRACTITIONER

## 2024-06-20 PROCEDURE — 1036F TOBACCO NON-USER: CPT | Performed by: NURSE PRACTITIONER

## 2024-06-20 PROCEDURE — G0439 PPPS, SUBSEQ VISIT: HCPCS | Performed by: NURSE PRACTITIONER

## 2024-06-20 ASSESSMENT — PATIENT HEALTH QUESTIONNAIRE - PHQ9
4. FEELING TIRED OR HAVING LITTLE ENERGY: SEVERAL DAYS
3. TROUBLE FALLING OR STAYING ASLEEP OR SLEEPING TOO MUCH: NOT AT ALL
1. LITTLE INTEREST OR PLEASURE IN DOING THINGS: MORE THAN HALF THE DAYS
8. MOVING OR SPEAKING SO SLOWLY THAT OTHER PEOPLE COULD HAVE NOTICED. OR THE OPPOSITE, BEING SO FIGETY OR RESTLESS THAT YOU HAVE BEEN MOVING AROUND A LOT MORE THAN USUAL: SEVERAL DAYS
5. POOR APPETITE OR OVEREATING: NOT AT ALL
9. THOUGHTS THAT YOU WOULD BE BETTER OFF DEAD, OR OF HURTING YOURSELF: NOT AT ALL
10. IF YOU CHECKED OFF ANY PROBLEMS, HOW DIFFICULT HAVE THESE PROBLEMS MADE IT FOR YOU TO DO YOUR WORK, TAKE CARE OF THINGS AT HOME, OR GET ALONG WITH OTHER PEOPLE: SOMEWHAT DIFFICULT
7. TROUBLE CONCENTRATING ON THINGS, SUCH AS READING THE NEWSPAPER OR WATCHING TELEVISION: SEVERAL DAYS
SUM OF ALL RESPONSES TO PHQ QUESTIONS 1-9: 7
SUM OF ALL RESPONSES TO PHQ9 QUESTIONS 1 AND 2: 4
6. FEELING BAD ABOUT YOURSELF - OR THAT YOU ARE A FAILURE OR HAVE LET YOURSELF OR YOUR FAMILY DOWN: NOT AT ALL
2. FEELING DOWN, DEPRESSED OR HOPELESS: MORE THAN HALF THE DAYS

## 2024-06-20 ASSESSMENT — ACTIVITIES OF DAILY LIVING (ADL)
BATHING: INDEPENDENT
DRESSING: INDEPENDENT
DOING_HOUSEWORK: TOTAL CARE
MANAGING_FINANCES: TOTAL CARE
GROCERY_SHOPPING: TOTAL CARE
TAKING_MEDICATION: TOTAL CARE

## 2024-06-20 ASSESSMENT — ENCOUNTER SYMPTOMS
LOSS OF SENSATION IN FEET: 1
DEPRESSION: 1
OCCASIONAL FEELINGS OF UNSTEADINESS: 1

## 2024-06-20 NOTE — PROGRESS NOTES
"Subjective   Patient ID: Seda Gutierrez is a 44 y.o. female who presents for Medicare Annual Wellness Visit Subsequent (Patient is here today for a referral for a neurologist due to forgetting things lately. Patient states her mood has changed and she is more frustrated. ).    44 year old female here for annual medicare wellness. Accompanied by .     Prevention: adopted does not know fam HX  Breast Ca screen:  Colon Ca Screen:  Lung Ca Screen: Mnon smoker  Diabetes Screen: DM1 managed by Endo  Opthal: Wears glasses; UTD this year  Dental: Q 6 months  GYN: UTD PAP    Concerned with poor memory and getting aggitated easily.  Used to see neurology but it has been several years.  Caregiver asking for a new referral.  States that she forgets something very quickly she gets agitated when she cannot do something.  This is new            Review of Systems    Objective   BP 94/63   Pulse 50   Ht 1.6 m (5' 3\")   Wt 66.2 kg (146 lb)   SpO2 97%   BMI 25.86 kg/m²     Physical Exam  Vitals reviewed.   Constitutional:       General: She is not in acute distress.     Appearance: Normal appearance. She is obese.   HENT:      Head: Normocephalic and atraumatic.   Eyes:      Extraocular Movements: Extraocular movements intact.      Conjunctiva/sclera: Conjunctivae normal.      Pupils: Pupils are equal, round, and reactive to light.   Neck:      Vascular: No carotid bruit.   Cardiovascular:      Rate and Rhythm: Normal rate and regular rhythm.      Pulses: Normal pulses.      Heart sounds: No murmur heard.  Pulmonary:      Effort: Pulmonary effort is normal.      Breath sounds: Normal breath sounds.   Abdominal:      General: Bowel sounds are normal. There is no distension.      Palpations: Abdomen is soft.      Tenderness: There is no abdominal tenderness.   Musculoskeletal:         General: Normal range of motion.      Cervical back: Normal range of motion and neck supple.   Skin:     General: Skin is warm and dry. "   Neurological:      General: No focal deficit present.      Mental Status: She is alert. Mental status is at baseline.   Psychiatric:         Mood and Affect: Mood normal.         Behavior: Behavior normal.      Comments: Difficult to assess mentation.  She inappropriately laughs about things.  She does not hold a conversation.  She answers simple yes and no and that is about it.         Assessment/Plan   Diagnoses and all orders for this visit:  Medicare annual wellness visit, subsequent  -     CBC and Auto Differential; Future  -     Comprehensive Metabolic Panel; Future  Routine general medical examination at health care facility  -     1 Year Follow Up In Primary Care - Wellness Exam; Future  Lipid screening  -     Lipid Panel; Future  Chronic fatigue  -     Vitamin B12; Future  -     Vitamin D 25-Hydroxy,Total (for eval of Vitamin D levels); Future  -     CBC and Auto Differential; Future  Memory impairment  -     Referral to Neurology; Future  Developmental delay  -     Referral to Neurology; Future  Vitamin D deficiency  -     Vitamin D 25-Hydroxy,Total (for eval of Vitamin D levels); Future  Other orders  -     Follow Up In Primary Care - Established; Future    Follow up Q 6 months

## 2024-06-21 ENCOUNTER — LAB (OUTPATIENT)
Dept: LAB | Facility: LAB | Age: 44
End: 2024-06-21
Payer: MEDICARE

## 2024-06-21 DIAGNOSIS — Z13.220 LIPID SCREENING: ICD-10-CM

## 2024-06-21 DIAGNOSIS — E55.9 VITAMIN D DEFICIENCY: ICD-10-CM

## 2024-06-21 DIAGNOSIS — R53.82 CHRONIC FATIGUE: ICD-10-CM

## 2024-06-21 DIAGNOSIS — Z00.00 MEDICARE ANNUAL WELLNESS VISIT, SUBSEQUENT: ICD-10-CM

## 2024-06-21 LAB
25(OH)D3 SERPL-MCNC: 34 NG/ML (ref 30–100)
ALBUMIN SERPL BCP-MCNC: 4.2 G/DL (ref 3.4–5)
ALP SERPL-CCNC: 64 U/L (ref 33–110)
ALT SERPL W P-5'-P-CCNC: 16 U/L (ref 7–45)
ANION GAP SERPL CALC-SCNC: 12 MMOL/L (ref 10–20)
AST SERPL W P-5'-P-CCNC: 16 U/L (ref 9–39)
BASOPHILS # BLD AUTO: 0.08 X10*3/UL (ref 0–0.1)
BASOPHILS NFR BLD AUTO: 1.2 %
BILIRUB SERPL-MCNC: 0.4 MG/DL (ref 0–1.2)
BUN SERPL-MCNC: 15 MG/DL (ref 6–23)
CALCIUM SERPL-MCNC: 9.6 MG/DL (ref 8.6–10.3)
CHLORIDE SERPL-SCNC: 105 MMOL/L (ref 98–107)
CHOLEST SERPL-MCNC: 139 MG/DL (ref 0–199)
CHOLESTEROL/HDL RATIO: 2.3
CO2 SERPL-SCNC: 30 MMOL/L (ref 21–32)
CREAT SERPL-MCNC: 0.72 MG/DL (ref 0.5–1.05)
EGFRCR SERPLBLD CKD-EPI 2021: >90 ML/MIN/1.73M*2
EOSINOPHIL # BLD AUTO: 0.68 X10*3/UL (ref 0–0.7)
EOSINOPHIL NFR BLD AUTO: 10 %
ERYTHROCYTE [DISTWIDTH] IN BLOOD BY AUTOMATED COUNT: 12.1 % (ref 11.5–14.5)
GLUCOSE SERPL-MCNC: 134 MG/DL (ref 74–99)
HCT VFR BLD AUTO: 43 % (ref 36–46)
HDLC SERPL-MCNC: 60.1 MG/DL
HGB BLD-MCNC: 13.9 G/DL (ref 12–16)
IMM GRANULOCYTES # BLD AUTO: 0.01 X10*3/UL (ref 0–0.7)
IMM GRANULOCYTES NFR BLD AUTO: 0.1 % (ref 0–0.9)
LDLC SERPL CALC-MCNC: 70 MG/DL
LYMPHOCYTES # BLD AUTO: 2.16 X10*3/UL (ref 1.2–4.8)
LYMPHOCYTES NFR BLD AUTO: 31.7 %
MCH RBC QN AUTO: 31.8 PG (ref 26–34)
MCHC RBC AUTO-ENTMCNC: 32.3 G/DL (ref 32–36)
MCV RBC AUTO: 98 FL (ref 80–100)
MONOCYTES # BLD AUTO: 0.41 X10*3/UL (ref 0.1–1)
MONOCYTES NFR BLD AUTO: 6 %
NEUTROPHILS # BLD AUTO: 3.47 X10*3/UL (ref 1.2–7.7)
NEUTROPHILS NFR BLD AUTO: 51 %
NON HDL CHOLESTEROL: 79 MG/DL (ref 0–149)
NRBC BLD-RTO: 0 /100 WBCS (ref 0–0)
PLATELET # BLD AUTO: 305 X10*3/UL (ref 150–450)
POTASSIUM SERPL-SCNC: 4.2 MMOL/L (ref 3.5–5.3)
PROT SERPL-MCNC: 6.6 G/DL (ref 6.4–8.2)
RBC # BLD AUTO: 4.37 X10*6/UL (ref 4–5.2)
SODIUM SERPL-SCNC: 143 MMOL/L (ref 136–145)
TRIGL SERPL-MCNC: 47 MG/DL (ref 0–149)
VIT B12 SERPL-MCNC: 234 PG/ML (ref 211–911)
VLDL: 9 MG/DL (ref 0–40)
WBC # BLD AUTO: 6.8 X10*3/UL (ref 4.4–11.3)

## 2024-06-21 PROCEDURE — 82306 VITAMIN D 25 HYDROXY: CPT

## 2024-06-21 PROCEDURE — 36415 COLL VENOUS BLD VENIPUNCTURE: CPT

## 2024-06-21 PROCEDURE — 82607 VITAMIN B-12: CPT

## 2024-07-01 ENCOUNTER — TELEPHONE (OUTPATIENT)
Dept: ENDOCRINOLOGY | Facility: CLINIC | Age: 44
End: 2024-07-01
Payer: MEDICARE

## 2024-07-01 NOTE — TELEPHONE ENCOUNTER
Seda Gutierrez   1980   [id number]   [phone]       Called patient and left voice message in regards to moving 7/19/24 8:15am appt to 10am. Advised patient to call office to confirm change.

## 2024-07-02 DIAGNOSIS — E10.65 TYPE 1 DIABETES MELLITUS WITH HYPERGLYCEMIA (MULTI): ICD-10-CM

## 2024-07-02 RX ORDER — INSULIN LISPRO 100 [IU]/ML
INJECTION, SOLUTION INTRAVENOUS; SUBCUTANEOUS
Qty: 30 ML | Refills: 0 | Status: SHIPPED | OUTPATIENT
Start: 2024-07-02

## 2024-07-02 NOTE — TELEPHONE ENCOUNTER
Ani accidentally left Seda's back up insulin in a hotel [place] another [place] she needs insulin sent to another pharmacy I will load this for you

## 2024-07-12 ENCOUNTER — TELEPHONE (OUTPATIENT)
Dept: ENDOCRINOLOGY | Facility: CLINIC | Age: 44
End: 2024-07-12
Payer: MEDICARE

## 2024-07-12 NOTE — TELEPHONE ENCOUNTER
Seda Gutierrez   1980   [id number]   [phone]       Called patient and spoke with answering service in regards to moving 7/19/24 10am appt to 1130am. Patient confirmed that would be fine.

## 2024-07-18 NOTE — PROGRESS NOTES
"HPI   43 yo with Diabetes 1, hashimoto's dz, dyslipidemia, developmental delay presents for followup. A1c-7.3% last visit, 8.7% today. (Recently on vacation for several weeks/lots of driving/not as physically active).     Pt is testing sugars >4 times per day, using dexcom g6. Pt is having low sugars 0-1 times/week.  Pt is following a carb controlled diet and knows reasonable carb allowances. The challenge is pt may be getting extra food/snack at workshop and not receiving insulin. Pt is able to afford their medications. Pt is walking as exercise.     PUMP:  tandem tslim with dexcom g6 cgm using control IQ  - pump training with suzie Dec. 2023  Basal:              12a 0.75  ICR:              12a 1:15  ISF:              12a 50              10a 40  -changed from 50 last visit               3p 50     - pt locked out of pump, caregiver bolusing for pt.  Relies on control iq autobolusing while at workshop during the day for her lunches      30 day dexcom g6 data: 47% in range, <1% low, pattern: upper 100's overnight into waking, after breakfast 200's, by lunch at [employer] or low, mid 100's-low 200's rest of day, no clear pattern.                  Pt taking estradiol through her ob for premature menopause.           Taking 125mcg levothyroxine.  No obstructive sx, euthyroid by hx.           Taking atorvastatin 20mg for lipids and tolerating.    -taking b12 daily since recent labs     History is obtained from Ani pt's caregiver today.      Current Outpatient Medications:     atorvastatin (Lipitor) 20 mg tablet, TAKE 1 TABLET BY MOUTH EVERY DAY FOR 90 DAYS, Disp: 90 tablet, Rfl: 1    BD Claudia 2nd Gen Pen Needle 32 gauge x 5/32\" needle, , Disp: , Rfl:     blood sugar diagnostic strip, as directed sq three times per day, e10.65 for 90 days, Disp: , Rfl:     blood-glucose sensor (Dexcom G6 Sensor) device, USE AS DIRECTED EVERY 10 DAYS FOR 30 DAYS, Disp: 3 each, Rfl: 3    blood-glucose transmitter device (Dexcom G6 Transmitter) " device, USE AS DIRECTED FOR 90 DAYS, Disp: 1 each, Rfl: 3    ergocalciferol (Vitamin D-2) 1.25 MG (26893 UT) capsule, Take 1 capsule (50,000 Units) by mouth 1 (one) time per week., Disp: , Rfl:     guanFACINE (Intuniv) 2 mg 24 hr tablet, Take 1 tablet (2 mg) by mouth once daily., Disp: , Rfl:     insulin lispro (HumaLOG) 100 unit/mL injection, -up to 100 units daily as directed in insulin pump, Disp: 30 mL, Rfl: 0    levothyroxine (Synthroid, Levoxyl) 125 mcg tablet, Take 1 tablet (125 mcg) by mouth once daily in the morning. Take before meals., Disp: 30 tablet, Rfl: 11      Allergies as of 07/19/2024    (No Known Allergies)         Review of Systems   Cardiology: Lightheadedness-denies.  Chest pain-denies.  Leg edema-denies.  Palpitations-denies.  Respiratory: Cough-denies. Shortness of breath-denies.  Wheezing-denies.  Gastroenterology: Constipation-denies.  Diarrhea-denies.  Heartburn-denies.  Endocrinology: Cold intolerance-denies.  Heat intolerance-denies.  Sweats-denies.  Neurology: Headache-denies.  Tremor-denies.  Neuropathy in extremities-denies.  Psychology: Low energy-denies.  Irritability-denies.  Sleep disturbances-denies.      BP 99/70   Pulse 51   Wt 67.9 kg (149 lb 9.6 oz)   BMI 26.50 kg/m²       Labs:  Lab Results   Component Value Date    WBC 6.8 06/21/2024    NRBC 0.0 06/21/2024    RBC 4.37 06/21/2024    HGB 13.9 06/21/2024    HCT 43.0 06/21/2024     06/21/2024     Lab Results   Component Value Date    CALCIUM 9.6 06/21/2024    AST 16 06/21/2024    ALKPHOS 64 06/21/2024    BILITOT 0.4 06/21/2024    PROT 6.6 06/21/2024    ALBUMIN 4.2 06/21/2024    GLOB 2.9 12/21/2021    AGR 1.4 (L) 12/21/2021     06/21/2024    K 4.2 06/21/2024     06/21/2024    CO2 30 06/21/2024    ANIONGAP 12 06/21/2024    BUN 15 06/21/2024    CREATININE 0.72 06/21/2024    UREACREAUR 21.4 (H) 02/09/2023    GLUCOSE 134 (H) 06/21/2024    ALT 16 06/21/2024    EGFR >90 06/21/2024     Lab Results   Component Value  Date    CHOL 139 06/21/2024    TRIG 47 06/21/2024    HDL 60.1 06/21/2024    LDLCALC 70 06/21/2024     Lab Results   Component Value Date    MICROALBCREA  11/06/2023      Comment:      One or more analytes used in this calculation is outside of the analytical measurement range. Calculation cannot be performed.     Lab Results   Component Value Date    TSH 0.36 (L) 04/20/2024     Lab Results   Component Value Date    [id number] 234 06/21/2024     Lab Results   Component Value Date    HGBA1C 8.7 (A) 07/19/2024         Physical Exam   General Appearance: pleasant, cooperative, no acute distress  HEENT: no chemosis, no proptosis, no lid lag, no lid retraction  Neck: no lymphadenopathy, no thyromegaly, no dominant thyroid nodules  Heart: no murmurs, regular rate and rhythm, S1 and S2  Lungs: no wheezes, no rhonci, no rales  Extremities: no lower extremity swelling      Assessment/Plan   1. Type 1 diabetes mellitus with hyperglycemia (Multi)  -A1c ordered and reviewed  -dexcom data reviewed  -labs reviewed  -refills up to date    -hx through Ani/caregiver  -recent driving vacation/less active/higher sugars post breakfast  -change I:C from 1:15 to 1:13 from 12am-10am to help with excursions post breakfast, if going low after this call office    2. Mixed hyperlipidemia  -on statin at [facility], labs reviewed no change, will follow    3. Hypothyroidism, unspecified type  -euthyroid on hx, labs reviewed, no change         Follow Up:  josr Carlos 3 months    Medical Decision Making  Complexity of problem: Chronic illness of diabetes mellitus uncontrolled, progressing  Data analyzed and reviewed: Reviewed prior notes, blood glucose data, labs including HgbA1c, lipids, serum chemistries.  Ordered tests.   Risk of complications and morbidities: Is definite because of use of insulin and risk of hypoglycemia.  Prescription medications reviewed and modifications made.  Compliance assessed.  Addressed social determinants of health  including food insecurity.

## 2024-07-19 ENCOUNTER — APPOINTMENT (OUTPATIENT)
Dept: ENDOCRINOLOGY | Facility: CLINIC | Age: 44
End: 2024-07-19
Payer: MEDICARE

## 2024-07-19 VITALS
DIASTOLIC BLOOD PRESSURE: 70 MMHG | SYSTOLIC BLOOD PRESSURE: 99 MMHG | BODY MASS INDEX: 26.5 KG/M2 | WEIGHT: 149.6 LBS | HEART RATE: 51 BPM

## 2024-07-19 DIAGNOSIS — E10.65 TYPE 1 DIABETES MELLITUS WITH HYPERGLYCEMIA (MULTI): Primary | ICD-10-CM

## 2024-07-19 DIAGNOSIS — E78.2 MIXED HYPERLIPIDEMIA: ICD-10-CM

## 2024-07-19 DIAGNOSIS — E03.9 HYPOTHYROIDISM, UNSPECIFIED TYPE: ICD-10-CM

## 2024-07-19 LAB — POC HEMOGLOBIN A1C: 8.7 % (ref 4.2–6.5)

## 2024-07-19 PROCEDURE — 83036 HEMOGLOBIN GLYCOSYLATED A1C: CPT | Performed by: INTERNAL MEDICINE

## 2024-07-19 PROCEDURE — 3048F LDL-C <100 MG/DL: CPT | Performed by: INTERNAL MEDICINE

## 2024-07-19 PROCEDURE — 99214 OFFICE O/P EST MOD 30 MIN: CPT | Performed by: INTERNAL MEDICINE

## 2024-07-19 PROCEDURE — 3074F SYST BP LT 130 MM HG: CPT | Performed by: INTERNAL MEDICINE

## 2024-07-19 PROCEDURE — 3078F DIAST BP <80 MM HG: CPT | Performed by: INTERNAL MEDICINE

## 2024-07-19 PROCEDURE — 1036F TOBACCO NON-USER: CPT | Performed by: INTERNAL MEDICINE

## 2024-07-19 ASSESSMENT — LIFESTYLE VARIABLES
HOW OFTEN DO YOU HAVE A DRINK CONTAINING ALCOHOL: NEVER
SKIP TO QUESTIONS 9-10: 1
HOW OFTEN DO YOU HAVE SIX OR MORE DRINKS ON ONE OCCASION: NEVER
AUDIT-C TOTAL SCORE: 0
HOW MANY STANDARD DRINKS CONTAINING ALCOHOL DO YOU HAVE ON A TYPICAL DAY: PATIENT DOES NOT DRINK

## 2024-07-19 ASSESSMENT — ENCOUNTER SYMPTOMS
OCCASIONAL FEELINGS OF UNSTEADINESS: 0
DEPRESSION: 0
LOSS OF SENSATION IN FEET: 0

## 2024-07-19 ASSESSMENT — PAIN SCALES - GENERAL: PAINLEVEL: 0-NO PAIN

## 2024-07-30 DIAGNOSIS — E10.65 TYPE 1 DIABETES MELLITUS WITH HYPERGLYCEMIA (MULTI): ICD-10-CM

## 2024-07-30 RX ORDER — INSULIN LISPRO 100 [IU]/ML
INJECTION, SOLUTION INTRAVENOUS; SUBCUTANEOUS
Qty: 30 ML | Refills: 0 | Status: SHIPPED | OUTPATIENT
Start: 2024-07-30

## 2024-09-11 DIAGNOSIS — E10.65 TYPE 1 DIABETES MELLITUS WITH HYPERGLYCEMIA (MULTI): ICD-10-CM

## 2024-09-11 RX ORDER — BLOOD-GLUCOSE SENSOR
EACH MISCELLANEOUS
Qty: 3 EACH | Refills: 3 | Status: SHIPPED | OUTPATIENT
Start: 2024-09-11

## 2024-09-12 DIAGNOSIS — E10.65 TYPE 1 DIABETES MELLITUS WITH HYPERGLYCEMIA (MULTI): Primary | ICD-10-CM

## 2024-09-13 RX ORDER — BLOOD SUGAR DIAGNOSTIC
STRIP MISCELLANEOUS
Qty: 100 STRIP | Refills: 3 | Status: SHIPPED | OUTPATIENT
Start: 2024-09-13

## 2024-09-13 RX ORDER — INSULIN GLARGINE 100 [IU]/ML
INJECTION, SOLUTION SUBCUTANEOUS
Qty: 30 ML | Refills: 6 | Status: SHIPPED | OUTPATIENT
Start: 2024-09-13

## 2024-09-25 NOTE — PROGRESS NOTES
"HPI   45 yo with Diabetes 1, hashimoto's dz, dyslipidemia, developmental delay presents for followup. A1c-8.7% last visit (vacation for several weeks/lots of driving/not as physically active).   8.2% today.     Pt is testing sugars >4 times per day, using dexcom g6. Pt is having low sugars 0-1 times/week.  Pt is following a carb controlled diet and knows reasonable carb allowances. The challenge is pt may be getting extra food/snack at workshop and not receiving insulin. Pt is able to afford their medications. Pt is walking as exercise.     Tandem t-slim with control IQ, Dexcom G, - pump training with suzie Dec. 2023  -has back up basal insulin at home for pump failure  -pt locked out of pump, caregiver bolusing for pt.    Time  Basal Rate (u/hour)  12:00 AM  0.75              Total Daily Basal:           18 units          Time  Correction Factor (mg/dl)  12:00 AM  50  10:00 AM  40 (was 50)   3:00 PM  50    Time  Carb Ratio (unit:grams)  12:00 AM   1:13 - increased from 1:15 last visit  10:00 AM  1:15       -relies on control iq auto boluses while at workshop during the day for her lunches  -pt found to be using  test strips     30 day dexcom G6 data: 62% (was 47%) in range, <1% low, pattern: upper 100's overnight into waking, after breakfast low 200's, by lunch at [employer] or low, upper 100's at workshop, mid 100's before dinner, low 100's at bedtime.  -often no carbs entered for breakfast  -some days no carbs entered entire day      -taking 125mcg levothyroxine.  No obstructive sx, euthyroid by hx.  -taking atorvastatin 20mg for lipids and tolerating.  -taking estradiol through her ob for premature menopause.  -taking b12 daily since recent labs     History is obtained from Ani pt's caregiver, today.      Current Outpatient Medications:     BD Claudia 2nd Gen Pen Needle 32 gauge x \" needle, , Disp: , Rfl:     blood-glucose sensor (Dexcom G6 Sensor) device, USE AS DIRECTED EVERY 10 DAYS FOR 30 DAYS, Disp: " 3 each, Rfl: 3    blood-glucose transmitter device (Dexcom G6 Transmitter) device, USE AS DIRECTED FOR 90 DAYS, Disp: 1 each, Rfl: 3    ergocalciferol (Vitamin D-2) 1.25 MG (09729 UT) capsule, Take 1 capsule (50,000 Units) by mouth 1 (one) time per week., Disp: , Rfl:     guanFACINE (Intuniv) 2 mg 24 hr tablet, Take 1 tablet (2 mg) by mouth once daily., Disp: , Rfl:     insulin glargine (Lantus Solostar U-100 Insulin) 100 unit/mL (3 mL) pen, INJECT 12 UNITS SUBCUTANEOUSLY AT BEDTIME FOR 90 DAYS, Disp: 30 mL, Rfl: 6    insulin lispro (HumaLOG U-100 Insulin) 100 unit/mL injection, INJECT -UP  UNITS DAILY AS DIRECTED IN INSULIN PUMP, Disp: 30 mL, Rfl: 0    levothyroxine (Synthroid, Levoxyl) 125 mcg tablet, Take 1 tablet (125 mcg) by mouth once daily in the morning. Take before meals., Disp: 30 tablet, Rfl: 11    OneTouch Ultra Test strip, USE 3 TIMES A DAY AS DIRECTED, Disp: 100 strip, Rfl: 3    atorvastatin (Lipitor) 20 mg tablet, TAKE 1 TABLET BY MOUTH EVERY DAY FOR 90 DAYS, Disp: 90 tablet, Rfl: 1    Allergies as of 10/02/2024    (No Known Allergies)       /71 (BP Location: Right arm, Patient Position: Sitting)   Pulse 63   Wt 68.5 kg (151 lb)   BMI 26.75 kg/m²     Labs:   Lab Results   Component Value Date    WBC 6.8 06/21/2024    NRBC 0.0 06/21/2024    RBC 4.37 06/21/2024    HGB 13.9 06/21/2024    HCT 43.0 06/21/2024     06/21/2024     Lab Results   Component Value Date    CALCIUM 9.6 06/21/2024    AST 16 06/21/2024    ALKPHOS 64 06/21/2024    BILITOT 0.4 06/21/2024    PROT 6.6 06/21/2024    ALBUMIN 4.2 06/21/2024    GLOB 2.9 12/21/2021    AGR 1.4 (L) 12/21/2021     06/21/2024    K 4.2 06/21/2024     06/21/2024    CO2 30 06/21/2024    ANIONGAP 12 06/21/2024    BUN 15 06/21/2024    CREATININE 0.72 06/21/2024    UREACREAUR 21.4 (H) 02/09/2023    GLUCOSE 134 (H) 06/21/2024    ALT 16 06/21/2024    EGFR >90 06/21/2024     Lab Results   Component Value Date    CHOL 139 06/21/2024     TRIG 47 06/21/2024    HDL 60.1 06/21/2024    LDLCALC 70 06/21/2024     Lab Results   Component Value Date    MICROALBCREA  11/06/2023      Comment:      One or more analytes used in this calculation is outside of the analytical measurement range. Calculation cannot be performed.     Lab Results   Component Value Date    TSH 0.36 (L) 04/20/2024     Lab Results   Component Value Date    [id number] 234 06/21/2024     Lab Results   Component Value Date    HGBA1C 8.2 (A) 10/02/2024         Assessment/Plan   1. Type 1 diabetes mellitus with hyperglycemia (Multi)  -A1c ordered and reviewed  -labs reviewed  -tandem source data reviewed with patient (scanned in epic), reviewed glycemic targets, reinforced looking for patterns and trends    -good improvement since last visit, needs to start entering carbs for all meals at home to improve postprandial hyperglycemia, no changes in pump settings indicated  -reinforced if entering carbs mid meal, enter 15-20 carbs less to prevent postprandial hypoglycemia  -reinforced treating lows with less glucose 5-10 grams of carbs; reinforced carrying glucose source at all times      2. Mixed hyperlipidemia  -on statin and tolerating    3. Hypothyroidism, unspecified type  -euthyroid    4. Presence of hybrid closed-loop insulin pump system  -tandem t-slim with control IQ      Follow up: 3-4 months Dr. Koch    -labs/tests/notes reviewed  -reviewed and counseled patient on medication monitoring and side effects    Treatment and plan discussed with Dr. Koch. Ines RN Certified Diabetes Care and     Medical Decision Making  Complexity of problem: Chronic illness of diabetes mellitus uncontrolled, progressing  Data analyzed and reviewed: Reviewed prior notes, blood glucose data, labs including HgbA1c, lipids, serum chemistries.  Ordered tests.   Risk of complications and morbidities: Is definite because of use of insulin and risk of hypoglycemia.  Prescription medications  reviewed and modifications made.  Compliance assessed.  Addressed social determinants of health including food insecurity.

## 2024-10-02 ENCOUNTER — APPOINTMENT (OUTPATIENT)
Dept: ENDOCRINOLOGY | Facility: CLINIC | Age: 44
End: 2024-10-02
Payer: MEDICARE

## 2024-10-02 VITALS
DIASTOLIC BLOOD PRESSURE: 71 MMHG | BODY MASS INDEX: 26.75 KG/M2 | WEIGHT: 151 LBS | HEART RATE: 63 BPM | SYSTOLIC BLOOD PRESSURE: 102 MMHG

## 2024-10-02 DIAGNOSIS — E10.65 TYPE 1 DIABETES MELLITUS WITH HYPERGLYCEMIA (MULTI): ICD-10-CM

## 2024-10-02 DIAGNOSIS — E78.2 MIXED HYPERLIPIDEMIA: ICD-10-CM

## 2024-10-02 DIAGNOSIS — E03.9 HYPOTHYROIDISM, UNSPECIFIED TYPE: ICD-10-CM

## 2024-10-02 DIAGNOSIS — Z96.41 PRESENCE OF HYBRID CLOSED-LOOP INSULIN PUMP SYSTEM: ICD-10-CM

## 2024-10-02 LAB — POC HEMOGLOBIN A1C: 8.2 % (ref 4.2–6.5)

## 2024-10-02 PROCEDURE — 99214 OFFICE O/P EST MOD 30 MIN: CPT | Performed by: INTERNAL MEDICINE

## 2024-10-02 PROCEDURE — 95251 CONT GLUC MNTR ANALYSIS I&R: CPT | Performed by: INTERNAL MEDICINE

## 2024-10-02 PROCEDURE — 83036 HEMOGLOBIN GLYCOSYLATED A1C: CPT | Performed by: INTERNAL MEDICINE

## 2024-10-02 RX ORDER — BLOOD SUGAR DIAGNOSTIC
1 STRIP MISCELLANEOUS 3 TIMES DAILY
Qty: 300 EACH | Refills: 1 | Status: SHIPPED | OUTPATIENT
Start: 2024-10-02

## 2024-10-02 ASSESSMENT — PAIN SCALES - GENERAL: PAINLEVEL: 0-NO PAIN

## 2024-10-02 NOTE — PROGRESS NOTES
Attestation signed by Cristian Koch MD on 10/2/24 at 4:16 PM.    I, Dr Cristian Koch, have reviewed this progress note, medication list, vital signs, any pertinent lab values, and any CGM data if present with the Certified Diabetes Care and  face to face during this visit today. This note reflects the treatment plan that was made under my direction after reviewing the above mentioned elements while face to face with the patient and CDE.  I personally answered and addressed any questions and concerns the patient had during the visit today.  The CDE entered the data in this note under my direction and I personally reviewed it, signed any lab or medication orders that I instructed to be completed. I am the billing provider for this visit and the level of service was determined by my involvement in the Medical Decision Making Component of this visit while face to face with the patient.

## 2024-10-02 NOTE — PATIENT INSTRUCTIONS
Enter carbs for all meals    If Seda has already started eating, enter 15-20 carbs less    Only use new test strips    Treat lows with sugar    Keep up the good work!!

## 2024-11-05 ENCOUNTER — OFFICE VISIT (OUTPATIENT)
Dept: NEUROLOGY | Facility: HOSPITAL | Age: 44
End: 2024-11-05
Payer: MEDICARE

## 2024-11-05 VITALS
WEIGHT: 149 LBS | DIASTOLIC BLOOD PRESSURE: 79 MMHG | HEART RATE: 51 BPM | BODY MASS INDEX: 26.4 KG/M2 | HEIGHT: 63 IN | SYSTOLIC BLOOD PRESSURE: 149 MMHG

## 2024-11-05 DIAGNOSIS — R62.50 DEVELOPMENTAL DELAY: ICD-10-CM

## 2024-11-05 DIAGNOSIS — R41.3 MEMORY IMPAIRMENT: Primary | ICD-10-CM

## 2024-11-05 PROCEDURE — 99205 OFFICE O/P NEW HI 60 MIN: CPT | Performed by: PSYCHIATRY & NEUROLOGY

## 2024-11-05 PROCEDURE — 3048F LDL-C <100 MG/DL: CPT | Performed by: PSYCHIATRY & NEUROLOGY

## 2024-11-05 PROCEDURE — 3077F SYST BP >= 140 MM HG: CPT | Performed by: PSYCHIATRY & NEUROLOGY

## 2024-11-05 PROCEDURE — 3008F BODY MASS INDEX DOCD: CPT | Performed by: PSYCHIATRY & NEUROLOGY

## 2024-11-05 PROCEDURE — 3078F DIAST BP <80 MM HG: CPT | Performed by: PSYCHIATRY & NEUROLOGY

## 2024-11-05 PROCEDURE — 99215 OFFICE O/P EST HI 40 MIN: CPT | Performed by: PSYCHIATRY & NEUROLOGY

## 2024-11-05 PROCEDURE — 99417 PROLNG OP E/M EACH 15 MIN: CPT | Performed by: PSYCHIATRY & NEUROLOGY

## 2024-11-05 RX ORDER — LANOLIN ALCOHOL/MO/W.PET/CERES
1000 CREAM (GRAM) TOPICAL DAILY
Qty: 90 TABLET | Refills: 3 | Status: SHIPPED | OUTPATIENT
Start: 2024-11-05 | End: 2025-11-05

## 2024-11-05 NOTE — PATIENT INSTRUCTIONS
You were seen today by Dr. Parker.  It is a pleasure seeing you.    Given the history and today's evaluation, I suspect cognitive changes are related to your developmental delay, differential diagnosis include less likely incipient neurodegenerative disorder, a multifactorial etiology can't be entirely ruled out.     PLAN:  MRI brain wo contrast  Take oral vitamin B12 1000 mcg daily  We discussed strategies to remain physically, mentally active and socially engaged.  Follow-up in 4-6 months or earlier if needed      Please call [phone] if you have any questions.     General brain health guidelines:  - make sure your other medical conditions are well controlled (e.g., high blood pressure, high cholesterol, diabetes, sleep apnea etc)  - do not smoke or chew tobacco  - address any sensory deficits (e.g., proper glasses for poor eyesight, hearing aids for hearing loss)  - use a weekly pill box  - eat a heart healthy diet (e.g., lots of fruits and vegetables, low fat and cholesterol)  - exercise at least four days per week, 30 minutes at a time at an intensity that would make it difficult to converse with someone   - make sure you are getting at least 7 hours of quality sleep per night  - keep yourself mentally active daily by reading, playing cards, doing word searches, puzzles, etc.  - stay socially active by being part of a group or organization

## 2024-11-05 NOTE — PROGRESS NOTES
"Provider impressions:  Ms. Gutierrez is a 44 -year-old RH woman  with 12  years of formal education and PMHx of  T1DM, hashimoto's dz, dyslipidemia, developmental delay who is presenting today with chief complaint of cognitive changes. History taking and evaluation are limited and challenging due to lack of adequate collateral history, no acute focal neurological deficits on exam.  Vitamin B12 is in low normal range and I recommended to take vitamin B12 1000 mcg daily, TSH was low bt free T4 is in normal range. Given the history and today's evaluation, I suspect cognitive changes are related to your developmental delay, differential diagnosis include less likely incipient neurodegenerative disorder, a multifactorial etiology can't be entirely ruled out.     PLAN:  MRI brain wo contrast  Take oral vitamin B12 1000 mcg daily  We discussed strategies to remain physically, mentally active and socially engaged.  Follow-up in 4-6 months or earlier if needed  History of present illness:  Ms. Gutierrez is a 44 -year-old RH woman  with 12  years of formal education and PMHx of  T1DM, hashimoto's dz, dyslipidemia, developmental delay who is presenting today with chief complaint of cognitive changes. referred by  PCP for cognitive evaluation.   She is accompanied by sister in law Ani who is the caregiver as well. Legal guardian is her brother Ludwin Gutierrez.  She has been noticing cognitive changes for years. Onset was gradual. has trouble remembering names of things, or people, forgets scheduled events.  Mood is \"good.\". has not noticed any significant depressive symptoms. Gets frustrated with memory problems.   No excessive worry or anxiety.     Has good appetite - eats healthy.     No change in personality, social disinhibition, change in dietary preferences, loss of empathy, stereotyped or repetitive behaviors.     No visual hallucinations, fluctuating cognition, tremors, REM sleep behavior disorder, falls.     Functional " changes:   Born and raised in [place], [place]  Sleep: goes to bed at 9 pm and  wakes up at 6 am  Current living situation - lives in a house with her brother and caregiver.   Driving -never drove a car  Finances - her caregiver and her brother manage finances  Cooking - Ani cooks.   ADLS - independent in basic ADLs.   Medications - caregiver  gives her the medications  Weapons at home: no  Knows 911? yes    Neuropsychiatric symptoms:   Depression - denies depression. Appetite ok. Sleeping fine. No worthlessness/helplessness. No suicidal thoughts, intent or plans.   Anxiety - Denies.   Psychosis - Denies.   Mojgan - Denies.   Suicidality - Denies.     Prior Work-up:   Normal TSH, Vit B12.   Neuropsychological testing    Past Neuropsychiatric History:  Handedness: right.   History of traumatic brain injury: None.   History of seizures: None  History of stroke: None.   Past psychiatric history: None    PMH/PSH:  As above, in addition    Meds: reviewed as listed    Allergies: reviewed as listed    Family history: She is adopted  Psychiatric: N/A  Dementia: N/A   Parkinsons disease: N/A  Huntingtons disease: N/A  ALS: N/A    Social History:   No Tobacco use, alcohol, recreational drugs  Single Has no children   Works at [employer]    Mental Status Examination:  General appearance: Well-groomed, good eye contact, cooperative  Orientation: Alert and oriented to person, place, and partially time    Cranial nerves:  CN II: visual fields full to confrontation  CN III, IV, VI: Pupils round, reactive to light and accommodation.  Lids symmetric.  No ptosis.  Extra-occular muscles are intact with normal alignment.  No nystagmus  CN V: Facial sensation intact bilaterally.    CN VII: Normal and symmetric facial strength.  Nasolabial folds symmetric  CN VIII: Hearing intact to finger rub  CN IX: Palate elevates symmetrically.  CN XI: Normal shoulder shrug and neck turning  CN XII: Tongue midline, with normal  bulk and strength, no fasciculations        Motor:  Muscle bulk was normal in all extremities.  5/5 strength in all extremities  Normal finger taps and hand opening  Normal tone  No abnormal movements    Reflexes:   Right UE     LEFT UE  BR: 2          BR: 2  Biceps: 2    Biceps: 2  Triceps: 2   Triceps: 2    RIGHT LE     LEFT LE  Knee: 2        Knee: 2  Ankle: 2       Ankle: 2    Negative Lea's reflex  No frontal release signs    Coordination:  Normal finger to nose testing and rapid alternating movements    Gait:  Able to stand from seated position with arms across chest  Stable gait    Sensory:  Negative Romberg's sign     ROS: All systems reviewed, pertinent positives noted in HPI

## 2024-11-07 ENCOUNTER — TELEPHONE (OUTPATIENT)
Dept: ENDOCRINOLOGY | Facility: CLINIC | Age: 44
End: 2024-11-07
Payer: MEDICARE

## 2024-11-07 NOTE — PROGRESS NOTES
HPI   Pt's caregiver called office 11/7/2024 with complaints of low blood sugar 2 weeks ago-60's and high blood sugars last week - 400's. Since unable to see patient's data remotely, here for office visit today.   43 yo with Diabetes 1, hashimoto's dz, dyslipidemia, developmental delay presents for followup. A1c-8.2% last visit, today 7.6%.      Pt is testing sugars >4 times per day, using dexcom g6. Pt is having low sugars 0-2 times/week.  Pt is following a carb controlled diet and knows reasonable carb allowances. The challenge had been pt getting extra food/snack at workshop and not receiving insulin. Pt is able to afford their medications. Pt is walking as exercise.     Tandem t-slim with control IQ, Dexcom G, - pump training with suzie Dec. 2023  -has back up basal insulin at home for pump failure  -pt locked out of pump, caregiver bolusing for pt.    Time  Basal Rate (u/hour)  12:00 AM  0.75              Total Daily Basal:           18 units          Time  Correction Factor (mg/dl)  12:00 AM  50  10:00 AM  40 (was 50)   3:00 PM  50    Time  Carb Ratio (unit:grams)  12:00 AM   1:13 - increased from 1:15 last visit  10:00 AM  1:15       -relies on control iq auto boluses while at workshop during the day for her lunches     30 day dexcom G6 data reviewed: 61%, <1% low, pattern: upper 100's overnight into waking, after breakfast low 200's, by lunch at [employer] or low, upper 100's at workshop, mid 100's before dinner, low 100's at bedtime.  -improvement entering carbs for meals  -lows noted days bolus given mid breakfast  -lows after correction am and while at workshop  -found to be changing infusion sites every 6.5 days    -taking 125mcg levothyroxine.  No obstructive sx, euthyroid by hx.  -taking atorvastatin 20mg for lipids and tolerating.  -taking estradiol through her ob for premature menopause.  -taking b12 daily since recent labs     History is obtained from Ani pt's caregiver, today, sign language  " used during visit      Current Outpatient Medications:     atorvastatin (Lipitor) 20 mg tablet, TAKE 1 TABLET BY MOUTH EVERY DAY FOR 90 DAYS, Disp: 90 tablet, Rfl: 1    BD Claudia 2nd Gen Pen Needle 32 gauge x 5/32\" needle, , Disp: , Rfl:     blood-glucose sensor (Dexcom G6 Sensor) device, USE AS DIRECTED EVERY 10 DAYS FOR 30 DAYS, Disp: 3 each, Rfl: 3    blood-glucose transmitter device (Dexcom G6 Transmitter) device, USE AS DIRECTED FOR 90 DAYS, Disp: 1 each, Rfl: 3    cyanocobalamin (Vitamin B-12) 1,000 mcg tablet, Take 1 tablet (1,000 mcg) by mouth once daily., Disp: 90 tablet, Rfl: 3    ergocalciferol (Vitamin D-2) 1.25 MG (63446 UT) capsule, Take 1 capsule (50,000 Units) by mouth 1 (one) time per week., Disp: , Rfl:     guanFACINE (Intuniv) 2 mg 24 hr tablet, Take 1 tablet (2 mg) by mouth once daily., Disp: , Rfl:     insulin glargine (Lantus Solostar U-100 Insulin) 100 unit/mL (3 mL) pen, INJECT 12 UNITS SUBCUTANEOUSLY AT BEDTIME FOR 90 DAYS, Disp: 30 mL, Rfl: 6    insulin lispro (HumaLOG U-100 Insulin) 100 unit/mL injection, INJECT -UP  UNITS DAILY AS DIRECTED IN INSULIN PUMP, Disp: 30 mL, Rfl: 0    levothyroxine (Synthroid, Levoxyl) 125 mcg tablet, Take 1 tablet (125 mcg) by mouth once daily in the morning. Take before meals., Disp: 30 tablet, Rfl: 11    OneTouch Ultra Test strip, USE 3 TIMES A DAY AS DIRECTED, Disp: 100 strip, Rfl: 3    OneTouch Ultra Test strip, 1 each 3 times a day., Disp: 300 each, Rfl: 1    Allergies as of 11/11/2024    (No Known Allergies)       BP 94/60 (BP Location: Right arm, Patient Position: Sitting)   Pulse 63   Wt 66.2 kg (146 lb)   BMI 25.86 kg/m²     Labs:   Lab Results   Component Value Date    WBC 6.8 06/21/2024    NRBC 0.0 06/21/2024    RBC 4.37 06/21/2024    HGB 13.9 06/21/2024    HCT 43.0 06/21/2024     06/21/2024     Lab Results   Component Value Date    CALCIUM 9.6 06/21/2024    AST 16 06/21/2024    ALKPHOS 64 06/21/2024    BILITOT 0.4 " 06/21/2024    PROT 6.6 06/21/2024    ALBUMIN 4.2 06/21/2024    GLOB 2.9 12/21/2021    AGR 1.4 (L) 12/21/2021     06/21/2024    K 4.2 06/21/2024     06/21/2024    CO2 30 06/21/2024    ANIONGAP 12 06/21/2024    BUN 15 06/21/2024    CREATININE 0.72 06/21/2024    UREACREAUR 21.4 (H) 02/09/2023    GLUCOSE 134 (H) 06/21/2024    ALT 16 06/21/2024    EGFR >90 06/21/2024     Lab Results   Component Value Date    CHOL 139 06/21/2024    TRIG 47 06/21/2024    HDL 60.1 06/21/2024    LDLCALC 70 06/21/2024     Lab Results   Component Value Date    MICROALBCREA  11/06/2023      Comment:      One or more analytes used in this calculation is outside of the analytical measurement range. Calculation cannot be performed.     Lab Results   Component Value Date    TSH 0.36 (L) 04/20/2024     Lab Results   Component Value Date    [id number] 234 06/21/2024     Lab Results   Component Value Date    HGBA1C 7.6 (A) 11/11/2024         Assessment/Plan   1. Type 1 diabetes mellitus with hyperglycemia (Multi)  -A1c ordered and reviewed, at [facility]  -labs reviewed  -tandem source data reviewed (scanned in epic)    -lows noted after auto corrections mornings and afternoons(at workshop)  -decrease correction factor 12:00 AM to 60 (was 50)  -decrease correction factor 10:00 AM to 50 (was 40)  -reinforced changing infusion sites every 3 days, reviewed filling syringe to 2.5 ml line  -changes made in pump, caregiver has copy of pump settings  -reviewed tandem source website for uploading pump between visits/brochure provided    2. Mixed hyperlipidemia  -on statin and tolerating    3. Hypothyroidism, unspecified type  -euthyroid    4. Presence of hybrid closed-loop insulin pump system  -tandem t-slim with control IQ    Follow up: as scheduled January Dr. Koch    -labs/tests/notes reviewed  -reviewed and counseled patient on medication monitoring and side effects    Treatment and plan discussed with Dr. Koch. Ines RN Certified Diabetes  Care and     Medical Decision Making  Complexity of problem: Chronic illness of diabetes mellitus uncontrolled, progressing  Data analyzed and reviewed: Reviewed prior notes, blood glucose data, labs including HgbA1c, lipids, serum chemistries.  Ordered tests.   Risk of complications and morbidities: Is definite because of use of insulin and risk of hypoglycemia.  Prescription medications reviewed and modifications made.  Compliance assessed.  Addressed social determinants of health including food insecurity.

## 2024-11-08 DIAGNOSIS — E10.65 TYPE 1 DIABETES MELLITUS WITH HYPERGLYCEMIA (MULTI): ICD-10-CM

## 2024-11-08 RX ORDER — BLOOD-GLUCOSE SENSOR
EACH MISCELLANEOUS
Qty: 3 EACH | Refills: 3 | Status: SHIPPED | OUTPATIENT
Start: 2024-11-08

## 2024-11-11 ENCOUNTER — APPOINTMENT (OUTPATIENT)
Dept: ENDOCRINOLOGY | Facility: CLINIC | Age: 44
End: 2024-11-11
Payer: MEDICARE

## 2024-11-11 VITALS
SYSTOLIC BLOOD PRESSURE: 94 MMHG | HEART RATE: 63 BPM | WEIGHT: 146 LBS | DIASTOLIC BLOOD PRESSURE: 60 MMHG | BODY MASS INDEX: 25.86 KG/M2

## 2024-11-11 DIAGNOSIS — E10.65 TYPE 1 DIABETES MELLITUS WITH HYPERGLYCEMIA (MULTI): ICD-10-CM

## 2024-11-11 DIAGNOSIS — E03.9 HYPOTHYROIDISM, UNSPECIFIED TYPE: ICD-10-CM

## 2024-11-11 DIAGNOSIS — Z96.41 PRESENCE OF HYBRID CLOSED-LOOP INSULIN PUMP SYSTEM: ICD-10-CM

## 2024-11-11 DIAGNOSIS — E78.2 MIXED HYPERLIPIDEMIA: ICD-10-CM

## 2024-11-11 LAB — POC HEMOGLOBIN A1C: 7.6 % (ref 4.2–6.5)

## 2024-11-11 PROCEDURE — 95251 CONT GLUC MNTR ANALYSIS I&R: CPT | Performed by: INTERNAL MEDICINE

## 2024-11-11 PROCEDURE — 83036 HEMOGLOBIN GLYCOSYLATED A1C: CPT | Performed by: INTERNAL MEDICINE

## 2024-11-11 PROCEDURE — 99214 OFFICE O/P EST MOD 30 MIN: CPT | Performed by: INTERNAL MEDICINE

## 2024-11-11 ASSESSMENT — PAIN SCALES - GENERAL: PAINLEVEL_OUTOF10: 0-NO PAIN

## 2024-11-11 NOTE — PROGRESS NOTES
Attestation signed by Cristian Koch MD on 11/11/24 at 4:13 PM.    I, Dr Cristian Koch, have reviewed this progress note, medication list, vital signs, any pertinent lab values, and any CGM data if present with the Certified Diabetes Care and  face to face during this visit today. This note reflects the treatment plan that was made under my direction after reviewing the above mentioned elements while face to face with the patient and CDE.  I personally answered and addressed any questions and concerns the patient had during the visit today.  The CDE entered the data in this note under my direction and I personally reviewed it, signed any lab or medication orders that I instructed to be completed. I am the billing provider for this visit and the level of service was determined by my involvement in the Medical Decision Making Component of this visit while face to face with the patient.

## 2024-11-11 NOTE — PATIENT INSTRUCTIONS
Change infusion sets every 3 days, fill syringe to 2.5 ml jaylan    If bolusing mid meal decrease carbs entered in pump    We lowered the correction factor   12:00 AM now 60  10:00 AM now 50    Upload pump to tandem source website as needed

## 2024-11-25 ENCOUNTER — HOSPITAL ENCOUNTER (OUTPATIENT)
Dept: RADIOLOGY | Facility: HOSPITAL | Age: 44
Discharge: HOME | End: 2024-11-25
Payer: MEDICARE

## 2024-11-25 DIAGNOSIS — R62.50 DEVELOPMENTAL DELAY: ICD-10-CM

## 2024-11-25 DIAGNOSIS — R41.3 MEMORY IMPAIRMENT: ICD-10-CM

## 2024-11-25 PROCEDURE — 70551 MRI BRAIN STEM W/O DYE: CPT

## 2024-11-25 PROCEDURE — 70551 MRI BRAIN STEM W/O DYE: CPT | Performed by: RADIOLOGY

## 2024-12-19 ENCOUNTER — PATIENT MESSAGE (OUTPATIENT)
Dept: PRIMARY CARE | Facility: CLINIC | Age: 44
End: 2024-12-19
Payer: MEDICARE

## 2024-12-19 DIAGNOSIS — E78.5 HYPERLIPIDEMIA, UNSPECIFIED: ICD-10-CM

## 2024-12-23 ENCOUNTER — APPOINTMENT (OUTPATIENT)
Dept: PRIMARY CARE | Facility: CLINIC | Age: 44
End: 2024-12-23
Payer: MEDICARE

## 2024-12-23 RX ORDER — ATORVASTATIN CALCIUM 20 MG/1
20 TABLET, FILM COATED ORAL DAILY
Qty: 90 TABLET | Refills: 1 | Status: SHIPPED | OUTPATIENT
Start: 2024-12-23 | End: 2025-06-21

## 2024-12-27 ENCOUNTER — PATIENT MESSAGE (OUTPATIENT)
Dept: ENDOCRINOLOGY | Facility: CLINIC | Age: 44
End: 2024-12-27
Payer: MEDICARE

## 2025-01-10 NOTE — PROGRESS NOTES
HPI   43 yo with Diabetes 1, hashimoto's dz, dyslipidemia, developmental delay presents for followup. A1c-8.2% last visit, today 7.6%.        Pt is testing sugars >4 times per day, using dexcom g6. Pt is having low sugars 0-2 times/week.  Pt is following a carb controlled diet and knows reasonable carb allowances.  Pt is able to afford their medications. Pt is walking as exercise still.     Tandem t-slim with control IQ, Dexcom G, - pump training with suzie Dec. 2023  -has back up basal insulin at home for pump failure  -pt locked out of pump, caregiver bolusing for pt.     Time                 Basal Rate (u/hour)  12:00 AM                     0.75                                                                                                                                            Total Daily Basal:           18 units           Time                 Correction Factor (mg/dl)  12:00 AM                     60 (was 50)  10:00 AM                     50 (was 40)   3:00 PM                      50     Time                 Carb Ratio (unit:grams)  12:00 AM                     1:13   10:00 AM                     1:15                                   -relies on control iq auto boluses while at workshop during the day for her lunches      30 day dexcom G6 data reviewed: 65% in range, <1% lows, pattern: mid 100's overnight into waking, some excursions into low 200's post breakfast/lunch at times, mid 100's rest of the day     -taking 125mcg levothyroxine.  No obstructive sx, euthyroid by hx.    -taking atorvastatin 20mg for lipids and tolerating.    -taking estradiol through her ob for premature menopause.  -  taking b12 daily since recent labs     History is obtained from Ani pt's caregiver, today,  used during visit    -changing infusion set q 3 days now      Current Outpatient Medications:     atorvastatin (Lipitor) 20 mg tablet, Take 1 tablet (20 mg) by mouth once daily., Disp: 90 tablet,  "Rfl: 1    BD Claudia 2nd Gen Pen Needle 32 gauge x 5/32\" needle, , Disp: , Rfl:     blood-glucose sensor (Dexcom G6 Sensor) device, USE AS DIRECTED EVERY 10 DAYS FOR 30 DAYS, Disp: 3 each, Rfl: 3    blood-glucose transmitter device (Dexcom G6 Transmitter) device, USE AS DIRECTED FOR 90 DAYS, Disp: 1 each, Rfl: 3    cyanocobalamin (Vitamin B-12) 1,000 mcg tablet, Take 1 tablet (1,000 mcg) by mouth once daily., Disp: 90 tablet, Rfl: 3    ergocalciferol (Vitamin D-2) 1.25 MG (40495 UT) capsule, Take 1 capsule (50,000 Units) by mouth 1 (one) time per week., Disp: , Rfl:     guanFACINE (Intuniv) 4 mg ER 24 hr tablet, Take 1 tablet (4 mg) by mouth early in the morning.., Disp: , Rfl:     insulin glargine (Lantus Solostar U-100 Insulin) 100 unit/mL (3 mL) pen, INJECT 12 UNITS SUBCUTANEOUSLY AT BEDTIME FOR 90 DAYS, Disp: 30 mL, Rfl: 6    insulin lispro (HumaLOG U-100 Insulin) 100 unit/mL injection, INJECT -UP  UNITS DAILY AS DIRECTED IN INSULIN PUMP, Disp: 30 mL, Rfl: 0    levothyroxine (Synthroid, Levoxyl) 125 mcg tablet, Take 1 tablet (125 mcg) by mouth once daily in the morning. Take before meals., Disp: 30 tablet, Rfl: 11    OneTouch Ultra Test strip, USE 3 TIMES A DAY AS DIRECTED, Disp: 100 strip, Rfl: 3    OneTouch Ultra Test strip, 1 each 3 times a day., Disp: 300 each, Rfl: 1      Allergies as of 01/13/2025    (No Known Allergies)         Review of Systems   Cardiology: Lightheadedness-denies.  Chest pain-denies.  Leg edema-denies.  Palpitations-denies.  Respiratory: Cough-denies. Shortness of breath-denies.  Wheezing-denies.  Gastroenterology: Constipation-denies.  Diarrhea-denies.  Heartburn-denies.  Endocrinology: Cold intolerance-denies.  Heat intolerance-denies.  Sweats-denies.  Neurology: Headache-denies.  Tremor-denies.  Neuropathy in extremities-denies.  Psychology: Low energy-denies.  Irritability-denies.  Sleep disturbances-denies.      /65 (BP Location: Right arm, Patient Position: Sitting)   " "Pulse (!) 49   Ht 1.6 m (5' 3\")   Wt 67.2 kg (148 lb 3.2 oz)   BMI 26.25 kg/m²       Labs:  Lab Results   Component Value Date    WBC 6.8 06/21/2024    NRBC 0.0 06/21/2024    RBC 4.37 06/21/2024    HGB 13.9 06/21/2024    HCT 43.0 06/21/2024     06/21/2024     Lab Results   Component Value Date    CALCIUM 9.6 06/21/2024    AST 16 06/21/2024    ALKPHOS 64 06/21/2024    BILITOT 0.4 06/21/2024    PROT 6.6 06/21/2024    ALBUMIN 4.2 06/21/2024    GLOB 2.9 12/21/2021    AGR 1.4 (L) 12/21/2021     06/21/2024    K 4.2 06/21/2024     06/21/2024    CO2 30 06/21/2024    ANIONGAP 12 06/21/2024    BUN 15 06/21/2024    CREATININE 0.72 06/21/2024    UREACREAUR 21.4 (H) 02/09/2023    GLUCOSE 134 (H) 06/21/2024    ALT 16 06/21/2024    EGFR >90 06/21/2024     Lab Results   Component Value Date    CHOL 139 06/21/2024    TRIG 47 06/21/2024    HDL 60.1 06/21/2024    LDLCALC 70 06/21/2024     Lab Results   Component Value Date    MICROALBCREA  11/06/2023      Comment:      One or more analytes used in this calculation is outside of the analytical measurement range. Calculation cannot be performed.     Lab Results   Component Value Date    TSH 0.36 (L) 04/20/2024     Lab Results   Component Value Date    [id number] 234 06/21/2024     Lab Results   Component Value Date    HGBA1C 7.7 (A) 01/13/2025         Physical Exam   General Appearance: pleasant, cooperative, no acute distress  HEENT: no chemosis, no proptosis, no lid lag, no lid retraction  Neck: no lymphadenopathy, no thyromegaly, no dominant thyroid nodules  Heart: no murmurs, regular rate and rhythm, S1 and S2  Lungs: no wheezes, no rhonci, no rales  Extremities: no lower extremity swelling      Assessment/Plan   1. Type 1 diabetes mellitus with hyperglycemia (Multi) (Primary)  -A1c ordered and reviewed  -glycemic log/cgm data reviewed  -labs reviewed, new labs ordered  -refill sent    -overall doing well, pt's aunt/caretaker helping with history, ABDON " translation service used as well today  -work on bolusing before eating rather than during the meal  -repeat full fasting labs prior to next visit    2. Mixed hyperlipidemia  -on statin and tolerating, labs reviewed, new labs ordered    3. Hypothyroidism, unspecified type  -euthyroid on therapy  -labs reviewed, repeat labs now         Follow Up:  JEANINE Carlos 4 months    Medical Decision Making  Complexity of problem: Chronic illness of diabetes mellitus uncontrolled, progressing  Data analyzed and reviewed: Reviewed prior notes, blood glucose data, labs including HgbA1c, lipids, serum chemistries.  Ordered tests.   Risk of complications and morbidities: Is definite because of use of insulin and risk of hypoglycemia.  Prescription medications reviewed and modifications made.  Compliance assessed.  Addressed social determinants of health including food insecurity.

## 2025-01-13 ENCOUNTER — APPOINTMENT (OUTPATIENT)
Dept: ENDOCRINOLOGY | Facility: CLINIC | Age: 45
End: 2025-01-13
Payer: MEDICARE

## 2025-01-13 VITALS
HEART RATE: 49 BPM | WEIGHT: 148.2 LBS | HEIGHT: 63 IN | DIASTOLIC BLOOD PRESSURE: 65 MMHG | SYSTOLIC BLOOD PRESSURE: 106 MMHG | BODY MASS INDEX: 26.26 KG/M2

## 2025-01-13 DIAGNOSIS — E03.9 HYPOTHYROIDISM, UNSPECIFIED TYPE: ICD-10-CM

## 2025-01-13 DIAGNOSIS — E10.65 TYPE 1 DIABETES MELLITUS WITH HYPERGLYCEMIA (MULTI): Primary | ICD-10-CM

## 2025-01-13 DIAGNOSIS — E78.2 MIXED HYPERLIPIDEMIA: ICD-10-CM

## 2025-01-13 LAB — POC HEMOGLOBIN A1C: 7.7 % (ref 4.2–6.5)

## 2025-01-13 PROCEDURE — 83036 HEMOGLOBIN GLYCOSYLATED A1C: CPT | Performed by: INTERNAL MEDICINE

## 2025-01-13 PROCEDURE — 3078F DIAST BP <80 MM HG: CPT | Performed by: INTERNAL MEDICINE

## 2025-01-13 PROCEDURE — 3008F BODY MASS INDEX DOCD: CPT | Performed by: INTERNAL MEDICINE

## 2025-01-13 PROCEDURE — 99214 OFFICE O/P EST MOD 30 MIN: CPT | Performed by: INTERNAL MEDICINE

## 2025-01-13 PROCEDURE — 1036F TOBACCO NON-USER: CPT | Performed by: INTERNAL MEDICINE

## 2025-01-13 PROCEDURE — 3074F SYST BP LT 130 MM HG: CPT | Performed by: INTERNAL MEDICINE

## 2025-01-13 RX ORDER — GUANFACINE 4 MG/1
1 TABLET, EXTENDED RELEASE ORAL
COMMUNITY
Start: 2025-01-02

## 2025-01-13 RX ORDER — BLOOD SUGAR DIAGNOSTIC
1 STRIP MISCELLANEOUS 3 TIMES DAILY
Qty: 300 EACH | Refills: 1 | Status: SHIPPED | OUTPATIENT
Start: 2025-01-13

## 2025-01-13 ASSESSMENT — ENCOUNTER SYMPTOMS
LOSS OF SENSATION IN FEET: 0
DEPRESSION: 0
OCCASIONAL FEELINGS OF UNSTEADINESS: 0

## 2025-01-13 ASSESSMENT — PATIENT HEALTH QUESTIONNAIRE - PHQ9
1. LITTLE INTEREST OR PLEASURE IN DOING THINGS: NOT AT ALL
SUM OF ALL RESPONSES TO PHQ9 QUESTIONS 1 & 2: 0
2. FEELING DOWN, DEPRESSED OR HOPELESS: NOT AT ALL

## 2025-01-13 ASSESSMENT — PAIN SCALES - GENERAL: PAINLEVEL_OUTOF10: 0-NO PAIN

## 2025-01-20 DIAGNOSIS — E10.65 TYPE 1 DIABETES MELLITUS WITH HYPERGLYCEMIA (MULTI): ICD-10-CM

## 2025-01-20 RX ORDER — INSULIN LISPRO 100 [IU]/ML
INJECTION, SOLUTION INTRAVENOUS; SUBCUTANEOUS
Qty: 30 ML | Refills: 0 | Status: SHIPPED | OUTPATIENT
Start: 2025-01-20

## 2025-01-20 NOTE — TELEPHONE ENCOUNTER
Ani states that the Pt has been having high and low BS after she eats, and she is not sure why. Pt's BS was okay after breakfast, but went up after that. Please advise.

## 2025-01-31 ASSESSMENT — PROMIS GLOBAL HEALTH SCALE
CARRYOUT_PHYSICAL_ACTIVITIES: MOSTLY
CARRYOUT_SOCIAL_ACTIVITIES: FAIR
EMOTIONAL_PROBLEMS: SOMETIMES
RATE_PHYSICAL_HEALTH: GOOD
RATE_AVERAGE_FATIGUE: MILD
RATE_AVERAGE_PAIN: 0
RATE_SOCIAL_SATISFACTION: FAIR
RATE_MENTAL_HEALTH: FAIR
RATE_QUALITY_OF_LIFE: GOOD
RATE_GENERAL_HEALTH: GOOD

## 2025-02-02 DIAGNOSIS — E03.9 HYPOTHYROIDISM, UNSPECIFIED TYPE: ICD-10-CM

## 2025-02-03 ENCOUNTER — APPOINTMENT (OUTPATIENT)
Dept: PRIMARY CARE | Facility: CLINIC | Age: 45
End: 2025-02-03
Payer: MEDICARE

## 2025-02-03 VITALS
HEART RATE: 51 BPM | DIASTOLIC BLOOD PRESSURE: 71 MMHG | SYSTOLIC BLOOD PRESSURE: 109 MMHG | OXYGEN SATURATION: 95 % | BODY MASS INDEX: 26.22 KG/M2 | HEIGHT: 63 IN | WEIGHT: 148 LBS

## 2025-02-03 DIAGNOSIS — E55.9 VITAMIN D DEFICIENCY: ICD-10-CM

## 2025-02-03 DIAGNOSIS — E78.2 MIXED HYPERLIPIDEMIA: ICD-10-CM

## 2025-02-03 DIAGNOSIS — Z13.220 LIPID SCREENING: ICD-10-CM

## 2025-02-03 DIAGNOSIS — E78.5 HYPERLIPIDEMIA, UNSPECIFIED: ICD-10-CM

## 2025-02-03 DIAGNOSIS — Z00.00 MEDICARE ANNUAL WELLNESS VISIT, SUBSEQUENT: Primary | ICD-10-CM

## 2025-02-03 DIAGNOSIS — Z12.31 BREAST CANCER SCREENING BY MAMMOGRAM: ICD-10-CM

## 2025-02-03 DIAGNOSIS — R41.3 MEMORY IMPAIRMENT: ICD-10-CM

## 2025-02-03 DIAGNOSIS — E03.9 HYPOTHYROIDISM, UNSPECIFIED TYPE: ICD-10-CM

## 2025-02-03 DIAGNOSIS — E10.65 TYPE 1 DIABETES MELLITUS WITH HYPERGLYCEMIA (MULTI): ICD-10-CM

## 2025-02-03 PROCEDURE — 3008F BODY MASS INDEX DOCD: CPT | Performed by: NURSE PRACTITIONER

## 2025-02-03 PROCEDURE — G0439 PPPS, SUBSEQ VISIT: HCPCS | Performed by: NURSE PRACTITIONER

## 2025-02-03 PROCEDURE — 3078F DIAST BP <80 MM HG: CPT | Performed by: NURSE PRACTITIONER

## 2025-02-03 PROCEDURE — 3074F SYST BP LT 130 MM HG: CPT | Performed by: NURSE PRACTITIONER

## 2025-02-03 PROCEDURE — 1036F TOBACCO NON-USER: CPT | Performed by: NURSE PRACTITIONER

## 2025-02-03 RX ORDER — LEVOTHYROXINE SODIUM 125 UG/1
125 TABLET ORAL
Qty: 90 TABLET | Refills: 3 | Status: SHIPPED | OUTPATIENT
Start: 2025-02-03

## 2025-02-03 RX ORDER — ATORVASTATIN CALCIUM 20 MG/1
20 TABLET, FILM COATED ORAL DAILY
Qty: 90 TABLET | Refills: 3 | Status: SHIPPED | OUTPATIENT
Start: 2025-02-03

## 2025-02-03 NOTE — PROGRESS NOTES
"Subjective   Patient ID: Seda Gutierrez is a 44 y.o. female who presents for Annual Exam (Patient is here today for a annual physical. No complaints ).    44 year old female here for your Annual CPE/Medicare Wellness  Accompanied by caregiver for interpretation and  Lelia for  due to caregiver being deaf and pt with cognitive impairment and developmental delay    No changes in the past year.   Poor memory, forgetting a lot more. Saw Neuro, in Nov with MRI and a follow up in May. Was not placed on any medications. Upset has not been seen to review the MRI of the brain  I reviewed MRI brain:  \"IMPRESSION:  1. Marked volume loss involving the bilateral insula and temporal  lobes with T2 hyperintense signal located within these structures  which could reflect gliosis. Findings could be related to in utero or   vascular insult. Other consideration is sequela of dementia  such as Alzheimer's disease since there is predominant volume loss  involving the temporal lobes, insula, and to a lesser extent within  the parietal lobes. No striking asymmetric volume loss is seen  involving the frontal lobes.      2. 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      3. Generalized mild diffuse cerebral volume loss, greater than  expected patient's age.\"      Endo for hypothyroid and DM1 on insulin DX at age 4  Last set of labs 2023.  She gets her hemoglobin A1c in office with endocrinologist  Adopted  ADHD- psychiatry. Q 3 months in person  HLD- on statin  Dentist- q 6 months. Grinding with stress.   Vision- q year. Glasses. Recent change in vision. No pain or pressure  Exercise: very active.   Work during the day \"bored if not working\"         Review of Systems    Objective   /71   Pulse 51   Ht 1.6 m (5' 3\")   Wt 67.1 kg (148 lb)   SpO2 95%   BMI 26.22 kg/m²     Physical Exam  Vitals reviewed.   Constitutional:       General: She is not in " acute distress.     Appearance: Normal appearance.   HENT:      Head: Normocephalic and atraumatic.   Eyes:      Extraocular Movements: Extraocular movements intact.      Conjunctiva/sclera: Conjunctivae normal.      Pupils: Pupils are equal, round, and reactive to light.   Neck:      Vascular: No carotid bruit.   Cardiovascular:      Rate and Rhythm: Normal rate and regular rhythm.      Pulses: Normal pulses.      Heart sounds: Normal heart sounds. No murmur heard.  Pulmonary:      Effort: Pulmonary effort is normal.      Breath sounds: Normal breath sounds.   Abdominal:      General: Bowel sounds are normal. There is no distension.      Palpations: Abdomen is soft.      Tenderness: There is no abdominal tenderness.   Musculoskeletal:         General: Normal range of motion.      Cervical back: Normal range of motion and neck supple.   Lymphadenopathy:      Cervical: No cervical adenopathy.   Skin:     General: Skin is warm and dry.   Neurological:      General: No focal deficit present.      Mental Status: She is alert and oriented to person, place, and time. Mental status is at baseline.      Comments: She was able to tell me the day of the week.  She answers most questions but she does rely on her caregiver a lot.  His almost childlike movements and behaviors   Psychiatric:         Mood and Affect: Mood normal.         Behavior: Behavior normal.         Assessment/Plan   Diagnoses and all orders for this visit:  Medicare annual wellness visit, subsequent  Comments:  Follow-up 1 year and as needed  Orders:  -     CBC and Auto Differential; Future  -     Comprehensive Metabolic Panel; Future  Lipid screening  Comments:  On statin update fasting lipid panel  Memory impairment  Comments:  Need to follow-up with neurology.  Does appear on MRI she has got significant volume loss  Orders:  -     Vitamin D 25-Hydroxy,Total (for eval of Vitamin D levels); Future  -     Vitamin B12; Future  -     CBC and Auto  Differential; Future  Breast cancer screening by mammogram  -     BI mammo bilateral screening tomosynthesis; Future  Hypothyroidism, unspecified type  Comments:  Update TSH since she has not had done.  They want to continue seeing Dr. Koch endocrinology for management  Orders:  -     Vitamin D 25-Hydroxy,Total (for eval of Vitamin D levels); Future  -     Vitamin B12; Future  -     CBC and Auto Differential; Future  -     TSH with reflex to Free T4 if abnormal; Future  Type 1 diabetes mellitus with hyperglycemia (Multi)  Comments:  Continue follow-up with endocrinology.  Pump site looks good as does Dexcom site  Orders:  -     Vitamin D 25-Hydroxy,Total (for eval of Vitamin D levels); Future  -     Vitamin B12; Future  -     CBC and Auto Differential; Future  Mixed hyperlipidemia  -     Lipid Panel; Future  Vitamin D deficiency  -     Vitamin D 25-Hydroxy,Total (for eval of Vitamin D levels); Future  Hyperlipidemia, unspecified  Other orders  -     Follow Up In Primary Care - Health Maintenance  -     Follow Up In Primary Care - [facility]  -     Follow Up In Primary Care - Health Maintenance; Future

## 2025-02-24 ENCOUNTER — APPOINTMENT (OUTPATIENT)
Dept: RADIOLOGY | Facility: HOSPITAL | Age: 45
End: 2025-02-24
Payer: MEDICARE

## 2025-02-26 ENCOUNTER — HOSPITAL ENCOUNTER (OUTPATIENT)
Dept: RADIOLOGY | Facility: HOSPITAL | Age: 45
Discharge: HOME | End: 2025-02-26
Payer: MEDICARE

## 2025-02-26 DIAGNOSIS — Z12.31 BREAST CANCER SCREENING BY MAMMOGRAM: ICD-10-CM

## 2025-02-26 PROCEDURE — 77067 SCR MAMMO BI INCL CAD: CPT | Performed by: STUDENT IN AN ORGANIZED HEALTH CARE EDUCATION/TRAINING PROGRAM

## 2025-02-26 PROCEDURE — 77067 SCR MAMMO BI INCL CAD: CPT

## 2025-02-26 PROCEDURE — 77063 BREAST TOMOSYNTHESIS BI: CPT | Performed by: STUDENT IN AN ORGANIZED HEALTH CARE EDUCATION/TRAINING PROGRAM

## 2025-03-19 ENCOUNTER — TELEPHONE (OUTPATIENT)
Dept: ENDOCRINOLOGY | Facility: CLINIC | Age: 45
End: 2025-03-19
Payer: MEDICARE

## 2025-03-19 NOTE — TELEPHONE ENCOUNTER
Left message on mobile phone -need to switch 5/14 appt with KF to 5/7 appt with KF 2:00pm---asked pt to call back to confirm this change

## 2025-04-30 LAB
ALBUMIN SERPL-MCNC: 4.3 G/DL (ref 3.6–5.1)
ALBUMIN/CREAT UR: NORMAL MG/G CREAT
ALP SERPL-CCNC: 67 U/L (ref 31–125)
ALT SERPL-CCNC: 16 U/L (ref 6–29)
ANION GAP SERPL CALCULATED.4IONS-SCNC: 8 MMOL/L (CALC) (ref 7–17)
AST SERPL-CCNC: 18 U/L (ref 10–30)
BASOPHILS # BLD AUTO: 69 CELLS/UL (ref 0–200)
BASOPHILS NFR BLD AUTO: 1.3 %
BILIRUB SERPL-MCNC: 0.4 MG/DL (ref 0.2–1.2)
BUN SERPL-MCNC: 27 MG/DL (ref 7–25)
CALCIUM SERPL-MCNC: 9.9 MG/DL (ref 8.6–10.2)
CHLORIDE SERPL-SCNC: 104 MMOL/L (ref 98–110)
CHOLEST SERPL-MCNC: 150 MG/DL
CHOLEST/HDLC SERPL: 2.3 (CALC)
CO2 SERPL-SCNC: 30 MMOL/L (ref 20–32)
CREAT SERPL-MCNC: 0.75 MG/DL (ref 0.5–0.99)
CREAT UR-MCNC: 118 MG/DL (ref 20–275)
EGFRCR SERPLBLD CKD-EPI 2021: 101 ML/MIN/1.73M2
EOSINOPHIL # BLD AUTO: 625 CELLS/UL (ref 15–500)
EOSINOPHIL NFR BLD AUTO: 11.8 %
ERYTHROCYTE [DISTWIDTH] IN BLOOD BY AUTOMATED COUNT: 12.2 % (ref 11–15)
GLUCOSE SERPL-MCNC: 220 MG/DL (ref 65–99)
HCT VFR BLD AUTO: 40.6 % (ref 35–45)
HDLC SERPL-MCNC: 66 MG/DL
HGB BLD-MCNC: 13.3 G/DL (ref 11.7–15.5)
LDLC SERPL CALC-MCNC: 73 MG/DL (CALC)
LYMPHOCYTES # BLD AUTO: 1913 CELLS/UL (ref 850–3900)
LYMPHOCYTES NFR BLD AUTO: 36.1 %
MCH RBC QN AUTO: 32.3 PG (ref 27–33)
MCHC RBC AUTO-ENTMCNC: 32.8 G/DL (ref 32–36)
MCV RBC AUTO: 98.5 FL (ref 80–100)
MICROALBUMIN UR-MCNC: <0.2 MG/DL
MONOCYTES # BLD AUTO: 360 CELLS/UL (ref 200–950)
MONOCYTES NFR BLD AUTO: 6.8 %
NEUTROPHILS # BLD AUTO: 2332 CELLS/UL (ref 1500–7800)
NEUTROPHILS NFR BLD AUTO: 44 %
NONHDLC SERPL-MCNC: 84 MG/DL (CALC)
PLATELET # BLD AUTO: 288 THOUSAND/UL (ref 140–400)
PMV BLD REES-ECKER: 9.5 FL (ref 7.5–12.5)
POTASSIUM SERPL-SCNC: 4.2 MMOL/L (ref 3.5–5.3)
PROT SERPL-MCNC: 6.5 G/DL (ref 6.1–8.1)
RBC # BLD AUTO: 4.12 MILLION/UL (ref 3.8–5.1)
SODIUM SERPL-SCNC: 142 MMOL/L (ref 135–146)
T4 FREE SERPL-MCNC: 1.4 NG/DL (ref 0.8–1.8)
TRIGL SERPL-MCNC: 40 MG/DL
TSH SERPL-ACNC: 0.2 MIU/L
WBC # BLD AUTO: 5.3 THOUSAND/UL (ref 3.8–10.8)

## 2025-04-30 NOTE — PROGRESS NOTES
HPI   44 yo with Diabetes 1, hashimoto's dz, dyslipidemia, developmental delay presents for followup. A1c-7.6% last visit, today %.     Pt is testing sugars >4 times per day, using dexcom g6. Pt is having low sugars 0-2 times/week.  Pt is following a carb controlled diet and knows reasonable carb allowances.  Pt is able to afford their medications. Pt is walking as exercise still.     Tandem t-slim with control IQ, Dexcom G, - pump training with suzie Dec. 2023  -has back up basal insulin at home for pump failure  -pt locked out of pump, caregiver bolusing for pt.     Time                 Basal Rate (u/hour)  12:00 AM                     0.75                                                                                                                                            Total Daily Basal:           18 units           Time                 Correction Factor (mg/dl)  12:00 AM                     60 (was 50)  10:00 AM                     50 (was 40)   3:00 PM                      50     Time                 Carb Ratio (unit:grams)  12:00 AM                     1:13   10:00 AM                     1:15                                   -relies on control iq auto boluses while at workshop during the day for her lunches      30 day dexcom G6 data reviewed: 65% in range, <1% lows, pattern: mid 100's overnight into waking, some excursions into low 200's post breakfast/lunch at times, mid 100's rest of the day     -taking 125mcg levothyroxine.  No obstructive sx, euthyroid by hx.    -taking atorvastatin 20mg for lipids and tolerating.    -taking estradiol through her ob for premature menopause.    -taking b12 daily since recent labs     History is obtained from Ani, pt's caregiver, today,  used during visit    -changing infusion set q 3 days now    Lvbb1/25  1. Type 1 diabetes mellitus with hyperglycemia (Multi) (Primary)  -A1c ordered and reviewed  -glycemic log/cgm data reviewed  -labs  "reviewed, new labs ordered  -refill sent    -overall doing well, pt's aunt/caretaker helping with history, [email or web address] translation service used as well today  -work on bolusing before eating rather than during the meal  -repeat full fasting labs prior to next visit    2. Mixed hyperlipidemia  -on statin and tolerating, labs reviewed, new labs ordered    3. Hypothyroidism, unspecified type  -euthyroid on therapy  -labs reviewed, repeat labs now             Current Outpatient Medications:     atorvastatin (Lipitor) 20 mg tablet, Take 1 tablet (20 mg) by mouth once daily., Disp: 90 tablet, Rfl: 3    BD Claudia 2nd Gen Pen Needle 32 gauge x 5/32\" needle, , Disp: , Rfl:     blood-glucose sensor (Dexcom G6 Sensor) device, USE AS DIRECTED EVERY 10 DAYS FOR 30 DAYS, Disp: 3 each, Rfl: 3    blood-glucose transmitter device (Dexcom G6 Transmitter) device, USE AS DIRECTED FOR 90 DAYS, Disp: 1 each, Rfl: 3    cyanocobalamin (Vitamin B-12) 1,000 mcg tablet, Take 1 tablet (1,000 mcg) by mouth once daily., Disp: 90 tablet, Rfl: 3    ergocalciferol (Vitamin D-2) 1.25 MG (87750 UT) capsule, Take 1 capsule (50,000 Units) by mouth 1 (one) time per week., Disp: , Rfl:     guanFACINE (Intuniv) 4 mg ER 24 hr tablet, Take 1 tablet (4 mg) by mouth early in the morning.., Disp: , Rfl:     insulin glargine (Lantus Solostar U-100 Insulin) 100 unit/mL (3 mL) pen, INJECT 12 UNITS SUBCUTANEOUSLY AT BEDTIME FOR 90 DAYS, Disp: 30 mL, Rfl: 6    insulin lispro (HumaLOG U-100 Insulin) 100 unit/mL injection, INJECT -UP  UNITS DAILY AS DIRECTED IN INSULIN PUMP, Disp: 30 mL, Rfl: 0    levothyroxine (Synthroid, Levoxyl) 125 mcg tablet, TAKE 1 TABLET BY MOUTH ONCE DAILY IN THE MORNING BEFORE MEALS., Disp: 90 tablet, Rfl: 3    OneTouch Ultra Test strip, 1 each 3 times a day., Disp: 300 each, Rfl: 1    Allergies as of 05/07/2025    (No Known Allergies)       There were no vitals taken for this visit.    Labs:   Lab Results   Component Value Date    WBC 5.3 " 04/29/2025    NRBC 0.0 06/21/2024    RBC 4.12 04/29/2025    HGB 13.3 04/29/2025    HCT 40.6 04/29/2025     04/29/2025     Lab Results   Component Value Date    CALCIUM 9.9 04/29/2025    AST 18 04/29/2025    ALKPHOS 67 04/29/2025    BILITOT 0.4 04/29/2025    PROT 6.5 04/29/2025    ALBUMIN 4.3 04/29/2025    GLOB 2.9 12/21/2021    AGR 1.4 (L) 12/21/2021     04/29/2025    K 4.2 04/29/2025     04/29/2025    CO2 30 04/29/2025    ANIONGAP 8 04/29/2025    BUN 27 (H) 04/29/2025    CREATININE 0.75 04/29/2025    UREACREAUR 21.4 (H) 02/09/2023    GLUCOSE 220 (H) 04/29/2025    ALT 16 04/29/2025    EGFR 101 04/29/2025     Lab Results   Component Value Date    CHOL 150 04/29/2025    TRIG 40 04/29/2025    HDL 66 04/29/2025    LDLCALC 73 04/29/2025     Lab Results   Component Value Date    MICROALBCREA  11/06/2023      Comment:      One or more analytes used in this calculation is outside of the analytical measurement range. Calculation cannot be performed.     Lab Results   Component Value Date    TSH 0.20 (L) 04/29/2025     Lab Results   Component Value Date    QYNQOGHH96 234 06/21/2024     Lab Results   Component Value Date    HGBA1C 7.7 (A) 01/13/2025         Assessment/Plan   1. Type 1 diabetes mellitus with hyperglycemia (Multi) (Primary)  -A1c ordered and reviewed  -labs reviewed  -tandem source data reviewed with patient (scanned in epic)      2. Mixed hyperlipidemia      3. Hypothyroidism, unspecified type    4. Presence of hybrid closed-loop insulin pump system  -tandem t-slim with control IQ            Follow up:     -labs/tests/notes reviewed  -reviewed and counseled patient on medication monitoring and side effects    Treatment and plan discussed with Dr. Koch. Ines RN Certified Diabetes Care and     Medical Decision Making  Complexity of problem: Chronic illness of diabetes mellitus uncontrolled, progressing  Data analyzed and reviewed: Reviewed prior notes, blood glucose  data, labs including HgbA1c, lipids, serum chemistries.  Ordered tests.   Risk of complications and morbidities: Is definite because of use of insulin and risk of hypoglycemia.  Prescription medications reviewed and modifications made.  Compliance assessed.  Addressed social determinants of health including food insecurity.

## 2025-05-06 ENCOUNTER — APPOINTMENT (OUTPATIENT)
Dept: NEUROLOGY | Facility: HOSPITAL | Age: 45
End: 2025-05-06
Payer: MEDICARE

## 2025-05-07 ENCOUNTER — APPOINTMENT (OUTPATIENT)
Dept: ENDOCRINOLOGY | Facility: CLINIC | Age: 45
End: 2025-05-07
Payer: MEDICARE

## 2025-05-07 DIAGNOSIS — E10.65 TYPE 1 DIABETES MELLITUS WITH HYPERGLYCEMIA (MULTI): Primary | ICD-10-CM

## 2025-05-07 DIAGNOSIS — Z96.41 PRESENCE OF HYBRID CLOSED-LOOP INSULIN PUMP SYSTEM: ICD-10-CM

## 2025-05-07 DIAGNOSIS — E03.9 HYPOTHYROIDISM, UNSPECIFIED TYPE: ICD-10-CM

## 2025-05-07 DIAGNOSIS — E78.2 MIXED HYPERLIPIDEMIA: ICD-10-CM

## 2025-05-14 ENCOUNTER — APPOINTMENT (OUTPATIENT)
Dept: ENDOCRINOLOGY | Facility: CLINIC | Age: 45
End: 2025-05-14
Payer: MEDICARE

## 2025-05-20 ENCOUNTER — OFFICE VISIT (OUTPATIENT)
Dept: NEUROLOGY | Facility: HOSPITAL | Age: 45
End: 2025-05-20
Payer: MEDICARE

## 2025-05-20 VITALS — HEART RATE: 48 BPM | DIASTOLIC BLOOD PRESSURE: 72 MMHG | SYSTOLIC BLOOD PRESSURE: 117 MMHG

## 2025-05-20 DIAGNOSIS — R62.50 DEVELOPMENTAL DELAY: Primary | ICD-10-CM

## 2025-05-20 DIAGNOSIS — R41.3 MEMORY IMPAIRMENT: ICD-10-CM

## 2025-05-20 PROCEDURE — 3074F SYST BP LT 130 MM HG: CPT | Performed by: PSYCHIATRY & NEUROLOGY

## 2025-05-20 PROCEDURE — 99214 OFFICE O/P EST MOD 30 MIN: CPT | Performed by: PSYCHIATRY & NEUROLOGY

## 2025-05-20 PROCEDURE — 3051F HG A1C>EQUAL 7.0%<8.0%: CPT | Performed by: PSYCHIATRY & NEUROLOGY

## 2025-05-20 PROCEDURE — 3078F DIAST BP <80 MM HG: CPT | Performed by: PSYCHIATRY & NEUROLOGY

## 2025-05-20 ASSESSMENT — MONTREAL COGNITIVE ASSESSMENT (MOCA)
13. ORIENTATION SUBSCORE: 1
VISUOSPATIAL/EXECUTIVE SUBSCORE: 1
12. MEMORY INDEX SCORE: 0
WHAT IS THE TOTAL SCORE (OUT OF 30): 6
8. SERIAL SUBTRACTION OF 7S: 0
10. [FLUENCY] NAME WORDS STARTING WITH DESIGNATED LETTER: 0
4. NAME EACH OF THE THREE ANIMALS SHOWN: 1
11. FOR EACH PAIR OF WORDS, WHAT CATEGORY DO THEY BELONG TO (OUT OF 2): 0
6. READ LIST OF DIGITS [FORWARD/BACKWARD]: 1
5. MEMORY TRIALS: 0
WHAT LEVEL OF EDUCATION WAS ATTAINED: 1
9. REPEAT EACH SENTENCE: 0
7. [VIGILENCE] TAP WHEN HEARING DESIGNATED LETTER: 1

## 2025-05-20 NOTE — PROGRESS NOTES
"Provider impressions:  Ms. Gutierrez is a 45 -year-old RH woman  with 12  years of formal education and PMHx of  T1DM, hashimoto's dz, dyslipidemia, developmental delay who is presenting today for a follow-up visit for cognitive impairment. History taking and evaluation are limited and challenging due to lack of adequate collateral history, no acute focal neurological deficits on exam. MRI brain in 11/2024 personally reviewed and showed marked volume loss involving the bilateral insula and temporal lobes with T2 hyperintense signal located within these structures which could reflect gliosis. 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 No acute intracranial abnormalities. Vitamin B12 is in low normal range and I recommended to take vitamin B12 1000 mcg daily, TSH was low bt free T4 is in normal range. We reviewed the results of brain MRI which didn't show acute changes or brain masses but showed diffuse brain atrophy especially the areas involved in learning and memory, this might be related to your developmental delay, a neurodegenerative disorder, a multifactorial etiology can;t be ruled out.    PLAN:  We discussed strategies to remain physically, mentally active and socially engaged.  Please complete the blood work ordered by PCP  Follow-up to be arranged.     History of present illness:  Ms. Gutierrez is a 45 -year-old RH woman  with 12  years of formal education and PMHx of  T1DM, hashimoto's dz, dyslipidemia, developmental delay who is presenting today for a follow-up visit for cognitive impairment. She is accompanied by sister in law Ani who is the caregiver as well. Legal guardian is her brother Ludwin Gutierrez. No major changes since last visit.  She has been noticing cognitive changes for years. Onset was gradual. has trouble remembering names of things, or people, forgets scheduled events.  Mood is \"good.\". has not noticed any significant depressive symptoms. " Gets frustrated with memory problems.   No excessive worry or anxiety.      Has good appetite - eats healthy.      No change in personality, social disinhibition, change in dietary preferences, loss of empathy, stereotyped or repetitive behaviors.      No visual hallucinations, fluctuating cognition, tremors, REM sleep behavior disorder, falls.      Functional changes:   Born and raised in [place], [place]  Sleep: goes to bed at 9 pm and  wakes up at 6 am  Current living situation - lives in a house with her brother and caregiver.   Driving -never drove a car  Finances - her caregiver and her brother manage finances  Cooking - Ani cooks.   ADLS - independent in basic ADLs.   Medications - caregiver  gives her the medications  Weapons at home: no  Knows 911? yes     Neuropsychiatric symptoms:   Depression - denies depression. Appetite ok. Sleeping fine. No worthlessness/helplessness. No suicidal thoughts, intent or plans.   Anxiety - Denies.   Psychosis - Denies.   Mojgan - Denies.   Suicidality - Denies.      Prior Work-up:   MRI brain in 11/2024 personally reviewed and showed marked volume loss involving the bilateral insula and temporal lobes with T2 hyperintense signal located within these structures which could reflect gliosis. 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 No acute intracranial abnormalities.   Normal TSH, Vit B12.   Neuropsychological testing     Past Neuropsychiatric History:  Handedness: right.   History of traumatic brain injury: None.   History of seizures: None  History of stroke: None.   Past psychiatric history: None     PMH/PSH:  As above, in addition     Meds: reviewed as listed     Allergies: reviewed as listed     Family history: She is adopted  Psychiatric: N/A  Dementia: N/A   Parkinsons disease: N/A  Huntingtons disease: N/A  ALS: N/A     Social History:   No Tobacco use, alcohol, recreational drugs  Single Has no children    Works at [employer]     Mental Status Examination:  General appearance: Well-groomed, good eye contact, cooperative  Orientation: Alert and oriented to person, place, and partially time     Cranial nerves:  CN II: visual fields full to confrontation  CN III, IV, VI: Pupils round, reactive to light and accommodation.  Lids symmetric.  No ptosis.  Extra-occular muscles are intact with normal alignment.  No nystagmus  CN V: Facial sensation intact bilaterally.    CN VII: Normal and symmetric facial strength.  Nasolabial folds symmetric  CN VIII: Hearing intact to finger rub  CN IX: Palate elevates symmetrically.  CN XI: Normal shoulder shrug and neck turning  CN XII: Tongue midline, with normal bulk and strength, no fasciculations          Motor:  Muscle bulk was normal in all extremities.  5/5 strength in all extremities  Normal finger taps and hand opening  Normal tone  No abnormal movements     Reflexes:   Right UE     LEFT UE  BR: 2          BR: 2  Biceps: 2    Biceps: 2  Triceps: 2   Triceps: 2     RIGHT LE     LEFT LE  Knee: 2        Knee: 2  Ankle: 2       Ankle: 2     Negative Lea's reflex  No frontal release signs     Coordination:  Normal finger to nose testing and rapid alternating movements     Gait:  Able to stand from seated position with arms across chest  Stable gait     Sensory:  Negative Romberg's sign     ROS: All systems reviewed, pertinent positives noted in HPI

## 2025-05-20 NOTE — PATIENT INSTRUCTIONS
You were seen today by Dr. Parker.  It is a pleasure seeing you.    We reviewed the results of brain MRI which didn't show acute changes or brain masses but showed diffuse brain atrophy especially the areas involved in learning and memory, this might be related to your developmental delay, a neurodegenerative disorder, a multifactorial etiology can;t be ruled out.    PLAN:  We discussed strategies to remain physically, mentally active and socially engaged.  Please complete the blood work ordered by PCP  Follow-up to be arranged.    General brain health guidelines:  - make sure your other medical conditions are well controlled (e.g., high blood pressure, high cholesterol, diabetes, sleep apnea etc)  - do not smoke or chew tobacco  - address any sensory deficits (e.g., proper glasses for poor eyesight, hearing aids for hearing loss)  - use a weekly pill box  - eat a heart healthy diet (e.g., lots of fruits and vegetables, low fat and cholesterol)  - exercise at least four days per week, 30 minutes at a time at an intensity that would make it difficult to converse with someone   - make sure you are getting at least 7 hours of quality sleep per night  - keep yourself mentally active daily by reading, playing cards, doing word searches, puzzles, etc.  - stay socially active by being part of a group or organization     Please call [phone] if you have any questions.

## 2025-05-21 LAB
25(OH)D3+25(OH)D2 SERPL-MCNC: 52 NG/ML (ref 30–100)
ALBUMIN SERPL-MCNC: 4.4 G/DL (ref 3.6–5.1)
ALP SERPL-CCNC: 72 U/L (ref 31–125)
ALT SERPL-CCNC: 18 U/L (ref 6–29)
ANION GAP SERPL CALCULATED.4IONS-SCNC: 10 MMOL/L (CALC) (ref 7–17)
AST SERPL-CCNC: 19 U/L (ref 10–30)
BASOPHILS # BLD AUTO: 61 CELLS/UL (ref 0–200)
BASOPHILS NFR BLD AUTO: 1 %
BILIRUB SERPL-MCNC: 0.5 MG/DL (ref 0.2–1.2)
BUN SERPL-MCNC: 17 MG/DL (ref 7–25)
CALCIUM SERPL-MCNC: 9.5 MG/DL (ref 8.6–10.2)
CHLORIDE SERPL-SCNC: 102 MMOL/L (ref 98–110)
CHOLEST SERPL-MCNC: 143 MG/DL
CHOLEST/HDLC SERPL: 2.1 (CALC)
CO2 SERPL-SCNC: 28 MMOL/L (ref 20–32)
CREAT SERPL-MCNC: 0.66 MG/DL (ref 0.5–0.99)
EGFRCR SERPLBLD CKD-EPI 2021: 112 ML/MIN/1.73M2
EOSINOPHIL # BLD AUTO: 329 CELLS/UL (ref 15–500)
EOSINOPHIL NFR BLD AUTO: 5.4 %
ERYTHROCYTE [DISTWIDTH] IN BLOOD BY AUTOMATED COUNT: 11.9 % (ref 11–15)
GLUCOSE SERPL-MCNC: 206 MG/DL (ref 65–139)
HCT VFR BLD AUTO: 43.3 % (ref 35–45)
HDLC SERPL-MCNC: 68 MG/DL
HGB BLD-MCNC: 14.1 G/DL (ref 11.7–15.5)
LDLC SERPL CALC-MCNC: 62 MG/DL (CALC)
LYMPHOCYTES # BLD AUTO: 2342 CELLS/UL (ref 850–3900)
LYMPHOCYTES NFR BLD AUTO: 38.4 %
MCH RBC QN AUTO: 32 PG (ref 27–33)
MCHC RBC AUTO-ENTMCNC: 32.6 G/DL (ref 32–36)
MCV RBC AUTO: 98.2 FL (ref 80–100)
MONOCYTES # BLD AUTO: 390 CELLS/UL (ref 200–950)
MONOCYTES NFR BLD AUTO: 6.4 %
NEUTROPHILS # BLD AUTO: 2977 CELLS/UL (ref 1500–7800)
NEUTROPHILS NFR BLD AUTO: 48.8 %
NONHDLC SERPL-MCNC: 75 MG/DL (CALC)
PLATELET # BLD AUTO: 319 THOUSAND/UL (ref 140–400)
PMV BLD REES-ECKER: 9.9 FL (ref 7.5–12.5)
POTASSIUM SERPL-SCNC: 4.1 MMOL/L (ref 3.5–5.3)
PROT SERPL-MCNC: 6.9 G/DL (ref 6.1–8.1)
RBC # BLD AUTO: 4.41 MILLION/UL (ref 3.8–5.1)
SODIUM SERPL-SCNC: 140 MMOL/L (ref 135–146)
TRIGL SERPL-MCNC: 54 MG/DL
VIT B12 SERPL-MCNC: >2000 PG/ML (ref 200–1100)
WBC # BLD AUTO: 6.1 THOUSAND/UL (ref 3.8–10.8)

## 2025-05-22 NOTE — PROGRESS NOTES
HPI           Current Medications[1]      Allergies as of 05/23/2025    (No Known Allergies)         Review of Systems   Cardiology: Lightheadedness-denies.  Chest pain-denies.  Leg edema-denies.  Palpitations-denies.  Respiratory: Cough-denies. Shortness of breath-denies.  Wheezing-denies.  Gastroenterology: Constipation-denies.  Diarrhea-denies.  Heartburn-denies.  Endocrinology: Cold intolerance-denies.  Heat intolerance-denies.  Sweats-denies.  Neurology: Headache-denies.  Tremor-denies.  Neuropathy in extremities-denies.  Psychology: Low energy-denies.  Irritability-denies.  Sleep disturbances-denies.      There were no vitals taken for this visit.      Labs:  Lab Results   Component Value Date    WBC 6.1 05/20/2025    NRBC 0.0 06/21/2024    RBC 4.41 05/20/2025    HGB 14.1 05/20/2025    HCT 43.3 05/20/2025     05/20/2025     Lab Results   Component Value Date    CALCIUM 9.5 05/20/2025    AST 19 05/20/2025    ALKPHOS 72 05/20/2025    BILITOT 0.5 05/20/2025    PROT 6.9 05/20/2025    ALBUMIN 4.4 05/20/2025    GLOB 2.9 12/21/2021    AGR 1.4 (L) 12/21/2021     05/20/2025    K 4.1 05/20/2025     05/20/2025    CO2 28 05/20/2025    ANIONGAP 10 05/20/2025    BUN 17 05/20/2025    CREATININE 0.66 05/20/2025    UREACREAUR 21.4 (H) 02/09/2023    GLUCOSE 206 (H) 05/20/2025    ALT 18 05/20/2025    EGFR 112 05/20/2025     Lab Results   Component Value Date    CHOL 143 05/20/2025    TRIG 54 05/20/2025    HDL 68 05/20/2025    LDLCALC 62 05/20/2025     Lab Results   Component Value Date    MICROALBCREA NOTE 04/29/2025     Lab Results   Component Value Date    TSH 0.20 (L) 04/29/2025     Lab Results   Component Value Date    DENRATGM49 >2000 (H) 05/20/2025     Lab Results   Component Value Date    HGBA1C 7.7 (A) 01/13/2025         Physical Exam   General Appearance: pleasant, cooperative, no acute distress  HEENT: no chemosis, no proptosis, no lid lag, no lid retraction  Neck: no lymphadenopathy, no thyromegaly,  "no dominant thyroid nodules  Heart: no murmurs, regular rate and rhythm, S1 and S2  Lungs: no wheezes, no rhonci, no rales  Extremities: no lower extremity swelling      Assessment/Plan   1. Type 1 diabetes mellitus with hyperglycemia (Multi) (Primary)  ***    2. Mixed hyperlipidemia  ***    3. Hypothyroidism, unspecified type  ***         Follow Up:      -labs/tests/notes reviewed  -reviewed and counseled patient on medication monitoring and side effects               [1]   Current Outpatient Medications:     atorvastatin (Lipitor) 20 mg tablet, Take 1 tablet (20 mg) by mouth once daily., Disp: 90 tablet, Rfl: 3    BD Claudia 2nd Gen Pen Needle 32 gauge x 5/32\" needle, , Disp: , Rfl:     blood-glucose sensor (Dexcom G6 Sensor) device, USE AS DIRECTED EVERY 10 DAYS FOR 30 DAYS, Disp: 3 each, Rfl: 3    blood-glucose transmitter device (Dexcom G6 Transmitter) device, USE AS DIRECTED FOR 90 DAYS, Disp: 1 each, Rfl: 3    cyanocobalamin (Vitamin B-12) 1,000 mcg tablet, Take 1 tablet (1,000 mcg) by mouth once daily., Disp: 90 tablet, Rfl: 3    ergocalciferol (Vitamin D-2) 1.25 MG (89478 UT) capsule, Take 1 capsule (50,000 Units) by mouth 1 (one) time per week., Disp: , Rfl:     guanFACINE (Intuniv) 4 mg ER 24 hr tablet, Take 1 tablet (4 mg) by mouth early in the morning.., Disp: , Rfl:     insulin glargine (Lantus Solostar U-100 Insulin) 100 unit/mL (3 mL) pen, INJECT 12 UNITS SUBCUTANEOUSLY AT BEDTIME FOR 90 DAYS, Disp: 30 mL, Rfl: 6    insulin lispro (HumaLOG U-100 Insulin) 100 unit/mL injection, INJECT -UP  UNITS DAILY AS DIRECTED IN INSULIN PUMP, Disp: 30 mL, Rfl: 0    levothyroxine (Synthroid, Levoxyl) 125 mcg tablet, TAKE 1 TABLET BY MOUTH ONCE DAILY IN THE MORNING BEFORE MEALS., Disp: 90 tablet, Rfl: 3    OneTouch Ultra Test strip, 1 each 3 times a day., Disp: 300 each, Rfl: 1    "

## 2025-05-23 ENCOUNTER — APPOINTMENT (OUTPATIENT)
Facility: CLINIC | Age: 45
End: 2025-05-23
Payer: MEDICARE

## 2025-05-23 ENCOUNTER — TELEPHONE (OUTPATIENT)
Facility: CLINIC | Age: 45
End: 2025-05-23

## 2025-05-23 DIAGNOSIS — E10.65 TYPE 1 DIABETES MELLITUS WITH HYPERGLYCEMIA (MULTI): Primary | ICD-10-CM

## 2025-05-23 DIAGNOSIS — E03.9 HYPOTHYROIDISM, UNSPECIFIED TYPE: ICD-10-CM

## 2025-05-23 DIAGNOSIS — E78.2 MIXED HYPERLIPIDEMIA: ICD-10-CM

## 2025-05-23 NOTE — TELEPHONE ENCOUNTER
Ludwin called to schedule a sooner appt for Seda ; in addition, to her Fall appt. Therefore, scheduled her with Radha.

## 2025-05-27 ENCOUNTER — TELEMEDICINE (OUTPATIENT)
Dept: PRIMARY CARE | Facility: CLINIC | Age: 45
End: 2025-05-27
Payer: MEDICARE

## 2025-05-27 DIAGNOSIS — F89 DEVELOPMENTAL DISABILITY: Primary | ICD-10-CM

## 2025-05-27 PROCEDURE — 98013 SYNCH AUDIO-ONLY EST LOW 20: CPT | Performed by: NURSE PRACTITIONER

## 2025-05-27 PROCEDURE — 3051F HG A1C>EQUAL 7.0%<8.0%: CPT | Performed by: NURSE PRACTITIONER

## 2025-05-27 NOTE — PROGRESS NOTES
"Subjective   Patient ID: Seda Gutierrez is a 45 y.o. female who presents for No chief complaint on file..    This is a virtual visit without video.  Patient has requested and is agreeable to be on video. I performed this visit using real-time telehealth tools; including audio/video connection between the pt. and I at [facility].       This was a phone call with  and Ani Gutierrez, legal guardian. Approx 20 min and 30 sec was spent on the phone   Wants a second opinion for neurology. She was not happy with neuro and said \"he did not know what the cause of her acute issues are\". I read the neurosurg note. This is the provider who ordered and reviewed the most recent MRI. He reviewed the result with pt caregiver. They just do not understand what her \"diagnosis is\". They felt uneasy and would like ot see someone else who may be able to provide more insight.   I reviewed the Note from the neurosurg over the phone with .  It seems like the MRI report did not show acute issues but more chronic, the MD thought poss related to congenital health.   Caregiver does not understand why she is getting more confused and does not now how to handle this.   I reviewed poss seeing a therapist, support group.   But I will reach out to a local Neurologist and see if he manages patients with congenital developmental delay.   I did review most recent bloodwork from 5//20/25         Review of Systems    Objective   There were no vitals taken for this visit.    Physical Exam  Constitutional:       Comments: No physical exam due to telephone call.          Assessment/Plan   Diagnoses and all orders for this visit:  Developmental disability  Comments:  i ma unsure a second opinion would be beneficial but will reach out to a colleague. I will review wiht leagal guardian when I return to office 6/2/25         "

## 2025-06-04 DIAGNOSIS — E10.65 TYPE 1 DIABETES MELLITUS WITH HYPERGLYCEMIA (MULTI): ICD-10-CM

## 2025-06-04 RX ORDER — INSULIN LISPRO 100 [IU]/ML
INJECTION, SOLUTION INTRAVENOUS; SUBCUTANEOUS
Qty: 30 ML | Refills: 0 | Status: SHIPPED | OUTPATIENT
Start: 2025-06-04

## 2025-06-05 ENCOUNTER — DOCUMENTATION (OUTPATIENT)
Dept: PRIMARY CARE | Facility: CLINIC | Age: 45
End: 2025-06-05
Payer: MEDICARE

## 2025-06-05 DIAGNOSIS — R41.3 MEMORY IMPAIRMENT: Primary | ICD-10-CM

## 2025-06-05 NOTE — PROGRESS NOTES
This patient meets the criteria for enrollment in CCM.   Referral pended for signature prior to discussing with patient/caregiver.

## 2025-06-11 ENCOUNTER — PATIENT OUTREACH (OUTPATIENT)
Dept: PRIMARY CARE | Facility: CLINIC | Age: 45
End: 2025-06-11
Payer: MEDICARE

## 2025-06-11 DIAGNOSIS — F89 DEVELOPMENTAL DISABILITY: ICD-10-CM

## 2025-06-11 DIAGNOSIS — E78.2 MIXED HYPERLIPIDEMIA: ICD-10-CM

## 2025-06-11 DIAGNOSIS — R41.3 MEMORY IMPAIRMENT: ICD-10-CM

## 2025-06-11 DIAGNOSIS — E10.65 TYPE 1 DIABETES MELLITUS WITH HYPERGLYCEMIA (MULTI): ICD-10-CM

## 2025-06-12 ENCOUNTER — PATIENT OUTREACH (OUTPATIENT)
Dept: PRIMARY CARE | Facility: CLINIC | Age: 45
End: 2025-06-12
Payer: MEDICARE

## 2025-06-16 ENCOUNTER — PATIENT OUTREACH (OUTPATIENT)
Dept: PRIMARY CARE | Facility: CLINIC | Age: 45
End: 2025-06-16
Payer: MEDICARE

## 2025-06-17 ENCOUNTER — APPOINTMENT (OUTPATIENT)
Facility: CLINIC | Age: 45
End: 2025-06-17
Payer: MEDICARE

## 2025-06-17 ENCOUNTER — TELEPHONE (OUTPATIENT)
Facility: CLINIC | Age: 45
End: 2025-06-17
Payer: MEDICARE

## 2025-06-19 ENCOUNTER — TELEPHONE (OUTPATIENT)
Facility: CLINIC | Age: 45
End: 2025-06-19
Payer: MEDICARE

## 2025-06-19 DIAGNOSIS — E10.65 TYPE 1 DIABETES MELLITUS WITH HYPERGLYCEMIA (MULTI): Primary | ICD-10-CM

## 2025-06-19 RX ORDER — PEN NEEDLE, DIABETIC 32GX 5/32"
NEEDLE, DISPOSABLE MISCELLANEOUS
Qty: 400 EACH | Refills: 2 | Status: SHIPPED | OUTPATIENT
Start: 2025-06-19

## 2025-06-19 NOTE — TELEPHONE ENCOUNTER
Ani thinks the pump is working her blood sugar is reading 258 on the pump and she wants to know if she should hold off on the injections to see if the pump is good

## 2025-06-19 NOTE — TELEPHONE ENCOUNTER
Patient woke up with blood sugar 427 which is not normal she gave her insulin sugar went up to 506 wasn't working so she called 911 sugar was over 600,stayed the night her heart was fine they said it was Diabetes causing stress on her heart the hospital was giving her 6 units at every meal and 12 units of Lantus this morning her blood sugar [id number] the only prescription she needs is the pen needles

## 2025-06-20 ENCOUNTER — TELEPHONE (OUTPATIENT)
Facility: CLINIC | Age: 45
End: 2025-06-20
Payer: MEDICARE

## 2025-06-20 NOTE — TELEPHONE ENCOUNTER
Ani calling to give update this morning Seda's sugar was 84 and then it went up to 333 after breakfast, she called the pump company for text support and then she filled out a replacement claim for the pump she took her off the pump and gavce her 8 units of insulin and then her sugar went down to 42 she gave her sugar tablets and will check her in a little bit

## 2025-06-20 NOTE — TELEPHONE ENCOUNTER
Caregiver calling she said the patient's sugar was 56 then she ate and now its 256 she wants to know if she should give her 8 or 6 units of insulin

## 2025-06-23 NOTE — PROGRESS NOTES
HPI     46 yo with Diabetes 1, hashimoto's dz, dyslipidemia, developmental delay presents for followup. A1c-7.6% last visit, this month 7.6%.    Pt is testing sugars >4 times per day, was using dexcom g6.  Pt's typical blood sugars are running 300's-400 on waking, 130-250's at lunch, 110-230's at dinner, 180-200's at hs.  Pt is having low sugars 0-2 times/week.  Pt is following a carb controlled diet and knows reasonable carb allowances.  Pt is able to afford their medications. Pt is walking as exercise still.    Taking lantus 12 units daily, humalog 6 units at meals (lows when tried 8u)      Currently off pump d/t malfunction, working with tech support on replacement pump   Tandem t-slim with control IQ, Dexcom G, - pump training with suzie Dec. 2023  -has back up basal insulin at home for pump failure  -pt locked out of pump, caregiver bolusing for pt.     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  No obstructive sx, euthyroid by hx.    Taking atorvastatin 20mg for lipids and tolerating.    Taking estradiol through her ob for premature menopause.    Taking b12 daily since recent labs     History is obtained from Ani, pt's caregiver, today.      Current Medications[1]    Allergies as of 06/24/2025    (No Known Allergies)       /75 (BP Location: Left arm, Patient Position: Sitting)   Pulse 54   Wt 66.2 kg (146 lb)   BMI 25.86 kg/m²     Labs:   Lab Results   Component Value Date    WBC 6.1  05/20/2025    NRBC 0.0 06/21/2024    RBC 4.41 05/20/2025    HGB 14.1 05/20/2025    HCT 43.3 05/20/2025     05/20/2025     Lab Results   Component Value Date    CALCIUM 9.5 05/20/2025    AST 19 05/20/2025    ALKPHOS 72 05/20/2025    BILITOT 0.5 05/20/2025    PROT 6.9 05/20/2025    ALBUMIN 4.4 05/20/2025    GLOB 2.9 12/21/2021    AGR 1.4 (L) 12/21/2021     05/20/2025    K 4.1 05/20/2025     05/20/2025    CO2 28 05/20/2025    ANIONGAP 10 05/20/2025    BUN 17 05/20/2025    CREATININE 0.66 05/20/2025    UREACREAUR 21.4 (H) 02/09/2023    GLUCOSE 206 (H) 05/20/2025    ALT 18 05/20/2025    EGFR 112 05/20/2025     Lab Results   Component Value Date    CHOL 143 05/20/2025    TRIG 54 05/20/2025    HDL 68 05/20/2025    LDLCALC 62 05/20/2025     Lab Results   Component Value Date    MICROALBCREA NOTE 04/29/2025     Lab Results   Component Value Date    TSH 0.20 (L) 04/29/2025     Lab Results   Component Value Date    [id number] >2000 (H) 05/20/2025     Lab Results   Component Value Date    HGBA1C 7.6 (H) 06/17/2025         Assessment/Plan   1. Type 1 diabetes mellitus with hyperglycemia (Multi) (Primary)    -A1c reviewed  -labs reviewed  -smbg log reviewed     -needs to get back on pump ASAP, personally reached out to rep Betancourt in hopes to speed along process   -given dexcom  sample to use in the mean time while off pump     -for now, rising overnight while sleeping / fasting hyperglycemia   -incr basal insulin from 12 to 20 units daily   -continue 6 units bolus insulin at meals     2. Mixed hyperlipidemia  -on statin, tolerating       Follow up: Oct as scheduled w/ radha, 8mon w/ yair     -labs/tests/notes reviewed  -reviewed and counseled patient on medication monitoring and side effects    Treatment and plan discussed with Dr. Koch.  LIAM Piper, PharmD, BC-ADM, [facility].     Medical Decision Making  Complexity of problem: Chronic illness of diabetes mellitus uncontrolled, progressing  Data analyzed  "and reviewed: Reviewed prior notes, blood glucose data, labs including HgbA1c, lipids, serum chemistries.  Ordered tests.   Risk of complications and morbidities: Is definite because of use of insulin and risk of hypoglycemia.  Prescription medications reviewed and modifications made.  Compliance assessed.  Addressed social determinants of health including food insecurity.           [1]   Current Outpatient Medications:     atorvastatin (Lipitor) 20 mg tablet, Take 1 tablet (20 mg) by mouth once daily., Disp: 90 tablet, Rfl: 3    BD Claudia 2nd Gen Pen Needle 32 gauge x 5/32\" needle, Use four times a day, Disp: 400 each, Rfl: 2    blood-glucose sensor (Dexcom G6 Sensor) device, USE AS DIRECTED EVERY 10 DAYS FOR 30 DAYS, Disp: 3 each, Rfl: 3    blood-glucose transmitter device (Dexcom G6 Transmitter) device, USE AS DIRECTED FOR 90 DAYS, Disp: 1 each, Rfl: 3    ergocalciferol (Vitamin D-2) 1.25 MG (40205 UT) capsule, Take 1 capsule (50,000 Units) by mouth 1 (one) time per week., Disp: , Rfl:     guanFACINE (Intuniv) 4 mg ER 24 hr tablet, Take 1 tablet (4 mg) by mouth early in the morning.., Disp: , Rfl:     insulin glargine (Lantus Solostar U-100 Insulin) 100 unit/mL (3 mL) pen, INJECT 12 UNITS SUBCUTANEOUSLY AT BEDTIME FOR 90 DAYS, Disp: 30 mL, Rfl: 6    insulin lispro (HumaLOG U-100 Insulin) 100 unit/mL injection, INJECT -UP  UNITS DAILY AS DIRECTED IN INSULIN PUMP, Disp: 30 mL, Rfl: 0    levothyroxine (Synthroid, Levoxyl) 125 mcg tablet, TAKE 1 TABLET BY MOUTH ONCE DAILY IN THE MORNING BEFORE MEALS., Disp: 90 tablet, Rfl: 3    OneTouch Ultra Test strip, 1 each 3 times a day., Disp: 300 each, Rfl: 1    "

## 2025-06-24 ENCOUNTER — CLINICAL SUPPORT (OUTPATIENT)
Facility: CLINIC | Age: 45
End: 2025-06-24
Payer: MEDICARE

## 2025-06-24 VITALS
HEART RATE: 54 BPM | WEIGHT: 146 LBS | SYSTOLIC BLOOD PRESSURE: 111 MMHG | DIASTOLIC BLOOD PRESSURE: 75 MMHG | BODY MASS INDEX: 25.86 KG/M2

## 2025-06-24 DIAGNOSIS — E78.2 MIXED HYPERLIPIDEMIA: ICD-10-CM

## 2025-06-24 DIAGNOSIS — E10.65 TYPE 1 DIABETES MELLITUS WITH HYPERGLYCEMIA (MULTI): Primary | ICD-10-CM

## 2025-06-24 DIAGNOSIS — E10.65 TYPE 1 DIABETES MELLITUS WITH HYPERGLYCEMIA (MULTI): ICD-10-CM

## 2025-06-24 PROCEDURE — 99214 OFFICE O/P EST MOD 30 MIN: CPT | Performed by: PHARMACIST

## 2025-06-24 PROCEDURE — 99214 OFFICE O/P EST MOD 30 MIN: CPT | Performed by: INTERNAL MEDICINE

## 2025-06-24 ASSESSMENT — PATIENT HEALTH QUESTIONNAIRE - PHQ9
2. FEELING DOWN, DEPRESSED OR HOPELESS: NOT AT ALL
1. LITTLE INTEREST OR PLEASURE IN DOING THINGS: NOT AT ALL
SUM OF ALL RESPONSES TO PHQ9 QUESTIONS 1 & 2: 0

## 2025-06-24 ASSESSMENT — PAIN SCALES - GENERAL: PAINLEVEL_OUTOF10: 0-NO PAIN

## 2025-06-24 NOTE — PROGRESS NOTES
Attestation signed by Cristian Koch MD on 6/24/25 at 9:43 AM.    I, Dr Cristian Koch, have reviewed this progress note, medication list, vital signs, any pertinent lab values, and any CGM data if present with the [facility] and  face to face during this visit today. This note reflects the treatment plan that was made under my direction after reviewing the above mentioned elements while face to face with the patient and CDE.  I personally answered and addressed any questions and concerns the patient had during the visit today.  The CDE entered the data in this note under my direction and I personally reviewed it, signed any lab or medication orders that I instructed to be completed. I am the billing provider for this visit and the level of service was determined by my involvement in the Medical Decision Making Component of this visit while face to face with the patient.

## 2025-06-25 RX ORDER — INSULIN LISPRO 100 [IU]/ML
INJECTION, SOLUTION INTRAVENOUS; SUBCUTANEOUS
Qty: 30 ML | Refills: 0 | Status: SHIPPED | OUTPATIENT
Start: 2025-06-25

## 2025-06-25 RX ORDER — BLOOD-GLUCOSE SENSOR
EACH MISCELLANEOUS
Qty: 9 EACH | Refills: 3 | Status: SHIPPED | OUTPATIENT
Start: 2025-06-25

## 2025-07-28 ENCOUNTER — PATIENT MESSAGE (OUTPATIENT)
Facility: CLINIC | Age: 45
End: 2025-07-28
Payer: MEDICARE

## 2025-07-28 DIAGNOSIS — E10.65 TYPE 1 DIABETES MELLITUS WITH HYPERGLYCEMIA (MULTI): ICD-10-CM

## 2025-07-29 RX ORDER — BLOOD-GLUCOSE TRANSMITTER
EACH MISCELLANEOUS
Qty: 1 EACH | Refills: 3 | Status: SHIPPED | OUTPATIENT
Start: 2025-07-29

## 2025-10-21 ENCOUNTER — APPOINTMENT (OUTPATIENT)
Facility: CLINIC | Age: 45
End: 2025-10-21
Payer: MEDICARE

## 2026-02-03 ENCOUNTER — APPOINTMENT (OUTPATIENT)
Dept: PRIMARY CARE | Facility: CLINIC | Age: 46
End: 2026-02-03
Payer: MEDICARE

## 2026-02-09 ENCOUNTER — APPOINTMENT (OUTPATIENT)
Dept: PRIMARY CARE | Facility: CLINIC | Age: 46
End: 2026-02-09
Payer: MEDICARE